# Patient Record
Sex: MALE | Employment: OTHER | URBAN - METROPOLITAN AREA
[De-identification: names, ages, dates, MRNs, and addresses within clinical notes are randomized per-mention and may not be internally consistent; named-entity substitution may affect disease eponyms.]

---

## 2017-01-01 ENCOUNTER — APPOINTMENT (OUTPATIENT)
Dept: GENERAL RADIOLOGY | Age: 77
DRG: 871 | End: 2017-01-01
Attending: UROLOGY
Payer: MEDICARE

## 2017-01-01 ENCOUNTER — ANESTHESIA (OUTPATIENT)
Dept: SURGERY | Age: 77
DRG: 871 | End: 2017-01-01
Payer: MEDICARE

## 2017-01-01 ENCOUNTER — APPOINTMENT (OUTPATIENT)
Dept: CT IMAGING | Age: 77
DRG: 871 | End: 2017-01-01
Attending: EMERGENCY MEDICINE
Payer: MEDICARE

## 2017-01-01 ENCOUNTER — APPOINTMENT (OUTPATIENT)
Dept: ULTRASOUND IMAGING | Age: 77
DRG: 871 | End: 2017-01-01
Attending: HOSPITALIST
Payer: MEDICARE

## 2017-01-01 ENCOUNTER — ANESTHESIA EVENT (OUTPATIENT)
Dept: SURGERY | Age: 77
DRG: 871 | End: 2017-01-01
Payer: MEDICARE

## 2017-01-01 ENCOUNTER — APPOINTMENT (OUTPATIENT)
Dept: GENERAL RADIOLOGY | Age: 77
DRG: 871 | End: 2017-01-01
Attending: EMERGENCY MEDICINE
Payer: MEDICARE

## 2017-01-01 ENCOUNTER — HOSPITAL ENCOUNTER (INPATIENT)
Age: 77
LOS: 3 days | Discharge: HOME | DRG: 871 | End: 2017-06-30
Attending: EMERGENCY MEDICINE | Admitting: HOSPITALIST
Payer: MEDICARE

## 2017-01-01 ENCOUNTER — HOSPITAL ENCOUNTER (INPATIENT)
Age: 77
LOS: 7 days | Discharge: HOME HEALTH | DRG: 871 | End: 2017-06-19
Attending: EMERGENCY MEDICINE | Admitting: HOSPITALIST
Payer: MEDICARE

## 2017-01-01 VITALS
RESPIRATION RATE: 20 BRPM | BODY MASS INDEX: 33.55 KG/M2 | WEIGHT: 261.4 LBS | SYSTOLIC BLOOD PRESSURE: 135 MMHG | HEART RATE: 109 BPM | TEMPERATURE: 97.7 F | OXYGEN SATURATION: 96 % | HEIGHT: 74 IN | DIASTOLIC BLOOD PRESSURE: 86 MMHG

## 2017-01-01 VITALS
HEIGHT: 74 IN | TEMPERATURE: 97.3 F | RESPIRATION RATE: 18 BRPM | HEART RATE: 81 BPM | BODY MASS INDEX: 31.69 KG/M2 | WEIGHT: 246.91 LBS | DIASTOLIC BLOOD PRESSURE: 72 MMHG | OXYGEN SATURATION: 97 % | SYSTOLIC BLOOD PRESSURE: 111 MMHG

## 2017-01-01 DIAGNOSIS — R41.82 ALTERED MENTAL STATUS, UNSPECIFIED ALTERED MENTAL STATUS TYPE: Primary | ICD-10-CM

## 2017-01-01 DIAGNOSIS — N39.0 RECURRENT UTI: ICD-10-CM

## 2017-01-01 DIAGNOSIS — N12 PYELONEPHRITIS: ICD-10-CM

## 2017-01-01 DIAGNOSIS — R00.0 TACHYCARDIA: ICD-10-CM

## 2017-01-01 DIAGNOSIS — R50.9 FEVER, UNSPECIFIED FEVER CAUSE: ICD-10-CM

## 2017-01-01 DIAGNOSIS — A41.9 SEPSIS, DUE TO UNSPECIFIED ORGANISM: Primary | ICD-10-CM

## 2017-01-01 DIAGNOSIS — N17.9 ACUTE KIDNEY INJURY (NONTRAUMATIC) (HCC): ICD-10-CM

## 2017-01-01 DIAGNOSIS — I48.91 ATRIAL FIBRILLATION, UNSPECIFIED TYPE (HCC): ICD-10-CM

## 2017-01-01 LAB
ALBUMIN SERPL BCP-MCNC: 2.9 G/DL (ref 3.4–5)
ALBUMIN SERPL BCP-MCNC: 3 G/DL (ref 3.4–5)
ALBUMIN/GLOB SERPL: 0.8 {RATIO} (ref 0.8–1.7)
ALBUMIN/GLOB SERPL: 0.9 {RATIO} (ref 0.8–1.7)
ALP SERPL-CCNC: 128 U/L (ref 45–117)
ALP SERPL-CCNC: 138 U/L (ref 45–117)
ALT SERPL-CCNC: 25 U/L (ref 16–61)
ALT SERPL-CCNC: 27 U/L (ref 16–61)
AMMONIA PLAS-SCNC: 19 UMOL/L (ref 11–32)
ANION GAP BLD CALC-SCNC: 10 MMOL/L (ref 3–18)
ANION GAP BLD CALC-SCNC: 11 MMOL/L (ref 3–18)
ANION GAP BLD CALC-SCNC: 14 MMOL/L (ref 3–18)
ANION GAP BLD CALC-SCNC: 6 MMOL/L (ref 3–18)
ANION GAP BLD CALC-SCNC: 6 MMOL/L (ref 3–18)
ANION GAP BLD CALC-SCNC: 8 MMOL/L (ref 3–18)
ANION GAP BLD CALC-SCNC: 8 MMOL/L (ref 3–18)
ANION GAP BLD CALC-SCNC: 9 MMOL/L (ref 3–18)
ANION GAP BLD CALC-SCNC: 9 MMOL/L (ref 3–18)
APPEARANCE UR: ABNORMAL
APPEARANCE UR: ABNORMAL
APTT PPP: 35.3 SEC (ref 23–36.4)
AST SERPL W P-5'-P-CCNC: 24 U/L (ref 15–37)
AST SERPL W P-5'-P-CCNC: 24 U/L (ref 15–37)
ATRIAL RATE: 117 BPM
ATRIAL RATE: 128 BPM
ATRIAL RATE: 144 BPM
ATRIAL RATE: 166 BPM
BACTERIA SPEC CULT: ABNORMAL
BACTERIA SPEC CULT: NORMAL
BACTERIA URNS QL MICRO: ABNORMAL /HPF
BACTERIA URNS QL MICRO: ABNORMAL /HPF
BASOPHILS # BLD AUTO: 0 K/UL (ref 0–0.06)
BASOPHILS # BLD: 0 % (ref 0–2)
BILIRUB SERPL-MCNC: 0.9 MG/DL (ref 0.2–1)
BILIRUB SERPL-MCNC: 1.9 MG/DL (ref 0.2–1)
BILIRUB UR QL: NEGATIVE
BILIRUB UR QL: NEGATIVE
BUN SERPL-MCNC: 16 MG/DL (ref 7–18)
BUN SERPL-MCNC: 17 MG/DL (ref 7–18)
BUN SERPL-MCNC: 21 MG/DL (ref 7–18)
BUN SERPL-MCNC: 21 MG/DL (ref 7–18)
BUN SERPL-MCNC: 22 MG/DL (ref 7–18)
BUN SERPL-MCNC: 23 MG/DL (ref 7–18)
BUN SERPL-MCNC: 29 MG/DL (ref 7–18)
BUN SERPL-MCNC: 30 MG/DL (ref 7–18)
BUN SERPL-MCNC: 35 MG/DL (ref 7–18)
BUN SERPL-MCNC: 38 MG/DL (ref 7–18)
BUN SERPL-MCNC: 41 MG/DL (ref 7–18)
BUN/CREAT SERPL: 15 (ref 12–20)
BUN/CREAT SERPL: 16 (ref 12–20)
BUN/CREAT SERPL: 16 (ref 12–20)
BUN/CREAT SERPL: 17 (ref 12–20)
BUN/CREAT SERPL: 18 (ref 12–20)
BUN/CREAT SERPL: 20 (ref 12–20)
BUN/CREAT SERPL: 22 (ref 12–20)
BUN/CREAT SERPL: 23 (ref 12–20)
BUN/CREAT SERPL: 24 (ref 12–20)
BUN/CREAT SERPL: 25 (ref 12–20)
BUN/CREAT SERPL: 25 (ref 12–20)
CA-I SERPL-SCNC: 1.05 MMOL/L (ref 1.12–1.32)
CALCIUM SERPL-MCNC: 7.7 MG/DL (ref 8.5–10.1)
CALCIUM SERPL-MCNC: 7.8 MG/DL (ref 8.5–10.1)
CALCIUM SERPL-MCNC: 7.8 MG/DL (ref 8.5–10.1)
CALCIUM SERPL-MCNC: 7.9 MG/DL (ref 8.5–10.1)
CALCIUM SERPL-MCNC: 8 MG/DL (ref 8.5–10.1)
CALCIUM SERPL-MCNC: 8.1 MG/DL (ref 8.5–10.1)
CALCIUM SERPL-MCNC: 8.1 MG/DL (ref 8.5–10.1)
CALCIUM SERPL-MCNC: 8.2 MG/DL (ref 8.5–10.1)
CALCIUM SERPL-MCNC: 8.3 MG/DL (ref 8.5–10.1)
CALCIUM SERPL-MCNC: 8.7 MG/DL (ref 8.5–10.1)
CALCIUM SERPL-MCNC: 8.8 MG/DL (ref 8.5–10.1)
CALCULATED R AXIS, ECG10: -51 DEGREES
CALCULATED R AXIS, ECG10: -58 DEGREES
CALCULATED R AXIS, ECG10: -80 DEGREES
CALCULATED R AXIS, ECG10: -86 DEGREES
CALCULATED T AXIS, ECG11: -11 DEGREES
CALCULATED T AXIS, ECG11: -11 DEGREES
CALCULATED T AXIS, ECG11: -16 DEGREES
CALCULATED T AXIS, ECG11: -4 DEGREES
CHLORIDE SERPL-SCNC: 106 MMOL/L (ref 100–108)
CHLORIDE SERPL-SCNC: 108 MMOL/L (ref 100–108)
CHLORIDE SERPL-SCNC: 109 MMOL/L (ref 100–108)
CHLORIDE SERPL-SCNC: 109 MMOL/L (ref 100–108)
CHLORIDE SERPL-SCNC: 111 MMOL/L (ref 100–108)
CHLORIDE SERPL-SCNC: 113 MMOL/L (ref 100–108)
CHLORIDE SERPL-SCNC: 115 MMOL/L (ref 100–108)
CHLORIDE SERPL-SCNC: 116 MMOL/L (ref 100–108)
CHLORIDE SERPL-SCNC: 117 MMOL/L (ref 100–108)
CHLORIDE SERPL-SCNC: 117 MMOL/L (ref 100–108)
CHLORIDE SERPL-SCNC: 119 MMOL/L (ref 100–108)
CK MB CFR SERPL CALC: ABNORMAL % (ref 0–4)
CK MB CFR SERPL CALC: ABNORMAL % (ref 0–4)
CK MB SERPL-MCNC: <1 NG/ML (ref 5–25)
CK MB SERPL-MCNC: <1 NG/ML (ref 5–25)
CK SERPL-CCNC: 22 U/L (ref 39–308)
CK SERPL-CCNC: 32 U/L (ref 39–308)
CO2 SERPL-SCNC: 22 MMOL/L (ref 21–32)
CO2 SERPL-SCNC: 22 MMOL/L (ref 21–32)
CO2 SERPL-SCNC: 23 MMOL/L (ref 21–32)
CO2 SERPL-SCNC: 24 MMOL/L (ref 21–32)
CO2 SERPL-SCNC: 25 MMOL/L (ref 21–32)
CO2 SERPL-SCNC: 29 MMOL/L (ref 21–32)
COLOR UR: ABNORMAL
COLOR UR: YELLOW
CREAT SERPL-MCNC: 0.93 MG/DL (ref 0.6–1.3)
CREAT SERPL-MCNC: 1 MG/DL (ref 0.6–1.3)
CREAT SERPL-MCNC: 1.04 MG/DL (ref 0.6–1.3)
CREAT SERPL-MCNC: 1.06 MG/DL (ref 0.6–1.3)
CREAT SERPL-MCNC: 1.19 MG/DL (ref 0.6–1.3)
CREAT SERPL-MCNC: 1.22 MG/DL (ref 0.6–1.3)
CREAT SERPL-MCNC: 1.37 MG/DL (ref 0.6–1.3)
CREAT SERPL-MCNC: 1.38 MG/DL (ref 0.6–1.3)
CREAT SERPL-MCNC: 1.4 MG/DL (ref 0.6–1.3)
CREAT SERPL-MCNC: 1.81 MG/DL (ref 0.6–1.3)
CREAT SERPL-MCNC: 2.32 MG/DL (ref 0.6–1.3)
DIAGNOSIS, 93000: NORMAL
DIFFERENTIAL METHOD BLD: ABNORMAL
EOSINOPHIL # BLD: 0 K/UL (ref 0–0.4)
EOSINOPHIL # BLD: 0.1 K/UL (ref 0–0.4)
EOSINOPHIL NFR BLD: 0 % (ref 0–5)
EOSINOPHIL NFR BLD: 1 % (ref 0–5)
EOSINOPHIL NFR BLD: 2 % (ref 0–5)
EOSINOPHIL NFR BLD: 2 % (ref 0–5)
EPITH CASTS URNS QL MICRO: ABNORMAL /LPF (ref 0–5)
EPITH CASTS URNS QL MICRO: ABNORMAL /LPF (ref 0–5)
ERYTHROCYTE [DISTWIDTH] IN BLOOD BY AUTOMATED COUNT: 15 % (ref 11.6–14.5)
ERYTHROCYTE [DISTWIDTH] IN BLOOD BY AUTOMATED COUNT: 15.2 % (ref 11.6–14.5)
ERYTHROCYTE [DISTWIDTH] IN BLOOD BY AUTOMATED COUNT: 15.2 % (ref 11.6–14.5)
ERYTHROCYTE [DISTWIDTH] IN BLOOD BY AUTOMATED COUNT: 15.3 % (ref 11.6–14.5)
ERYTHROCYTE [DISTWIDTH] IN BLOOD BY AUTOMATED COUNT: 15.4 % (ref 11.6–14.5)
ERYTHROCYTE [DISTWIDTH] IN BLOOD BY AUTOMATED COUNT: 15.6 % (ref 11.6–14.5)
GLOBULIN SER CALC-MCNC: 3.4 G/DL (ref 2–4)
GLOBULIN SER CALC-MCNC: 3.8 G/DL (ref 2–4)
GLUCOSE BLD STRIP.AUTO-MCNC: 138 MG/DL (ref 70–110)
GLUCOSE BLD STRIP.AUTO-MCNC: 141 MG/DL (ref 70–110)
GLUCOSE BLD STRIP.AUTO-MCNC: 158 MG/DL (ref 70–110)
GLUCOSE BLD STRIP.AUTO-MCNC: 161 MG/DL (ref 70–110)
GLUCOSE BLD STRIP.AUTO-MCNC: 169 MG/DL (ref 70–110)
GLUCOSE BLD STRIP.AUTO-MCNC: 169 MG/DL (ref 70–110)
GLUCOSE BLD STRIP.AUTO-MCNC: 177 MG/DL (ref 70–110)
GLUCOSE BLD STRIP.AUTO-MCNC: 180 MG/DL (ref 70–110)
GLUCOSE BLD STRIP.AUTO-MCNC: 186 MG/DL (ref 70–110)
GLUCOSE BLD STRIP.AUTO-MCNC: 197 MG/DL (ref 70–110)
GLUCOSE BLD STRIP.AUTO-MCNC: 198 MG/DL (ref 70–110)
GLUCOSE BLD STRIP.AUTO-MCNC: 202 MG/DL (ref 70–110)
GLUCOSE BLD STRIP.AUTO-MCNC: 202 MG/DL (ref 70–110)
GLUCOSE BLD STRIP.AUTO-MCNC: 212 MG/DL (ref 70–110)
GLUCOSE BLD STRIP.AUTO-MCNC: 218 MG/DL (ref 70–110)
GLUCOSE BLD STRIP.AUTO-MCNC: 223 MG/DL (ref 70–110)
GLUCOSE BLD STRIP.AUTO-MCNC: 228 MG/DL (ref 70–110)
GLUCOSE BLD STRIP.AUTO-MCNC: 242 MG/DL (ref 70–110)
GLUCOSE BLD STRIP.AUTO-MCNC: 246 MG/DL (ref 70–110)
GLUCOSE BLD STRIP.AUTO-MCNC: 261 MG/DL (ref 70–110)
GLUCOSE BLD STRIP.AUTO-MCNC: 287 MG/DL (ref 70–110)
GLUCOSE BLD STRIP.AUTO-MCNC: 307 MG/DL (ref 70–110)
GLUCOSE SERPL-MCNC: 115 MG/DL (ref 74–99)
GLUCOSE SERPL-MCNC: 129 MG/DL (ref 74–99)
GLUCOSE SERPL-MCNC: 146 MG/DL (ref 74–99)
GLUCOSE SERPL-MCNC: 153 MG/DL (ref 74–99)
GLUCOSE SERPL-MCNC: 172 MG/DL (ref 74–99)
GLUCOSE SERPL-MCNC: 191 MG/DL (ref 74–99)
GLUCOSE SERPL-MCNC: 194 MG/DL (ref 74–99)
GLUCOSE SERPL-MCNC: 196 MG/DL (ref 74–99)
GLUCOSE SERPL-MCNC: 200 MG/DL (ref 74–99)
GLUCOSE SERPL-MCNC: 205 MG/DL (ref 74–99)
GLUCOSE SERPL-MCNC: 256 MG/DL (ref 74–99)
GLUCOSE UR STRIP.AUTO-MCNC: NEGATIVE MG/DL
GLUCOSE UR STRIP.AUTO-MCNC: NEGATIVE MG/DL
GRAM STN SPEC: ABNORMAL
HCT VFR BLD AUTO: 34.1 % (ref 36–48)
HCT VFR BLD AUTO: 34.5 % (ref 36–48)
HCT VFR BLD AUTO: 35.1 % (ref 36–48)
HCT VFR BLD AUTO: 35.5 % (ref 36–48)
HCT VFR BLD AUTO: 36.4 % (ref 36–48)
HCT VFR BLD AUTO: 37.8 % (ref 36–48)
HCT VFR BLD AUTO: 40.3 % (ref 36–48)
HCT VFR BLD AUTO: 41.7 % (ref 36–48)
HGB BLD-MCNC: 10.9 G/DL (ref 13–16)
HGB BLD-MCNC: 10.9 G/DL (ref 13–16)
HGB BLD-MCNC: 11.1 G/DL (ref 13–16)
HGB BLD-MCNC: 11.2 G/DL (ref 13–16)
HGB BLD-MCNC: 11.8 G/DL (ref 13–16)
HGB BLD-MCNC: 12 G/DL (ref 13–16)
HGB BLD-MCNC: 13.4 G/DL (ref 13–16)
HGB BLD-MCNC: 13.7 G/DL (ref 13–16)
HGB UR QL STRIP: ABNORMAL
HGB UR QL STRIP: ABNORMAL
INR PPP: 1.1 (ref 0.8–1.2)
KETONES UR QL STRIP.AUTO: NEGATIVE MG/DL
KETONES UR QL STRIP.AUTO: NEGATIVE MG/DL
LACTATE BLD-SCNC: 1.2 MMOL/L (ref 0.4–2)
LACTATE BLD-SCNC: 2.3 MMOL/L (ref 0.4–2)
LACTATE BLD-SCNC: 4.9 MMOL/L (ref 0.4–2)
LEUKOCYTE ESTERASE UR QL STRIP.AUTO: ABNORMAL
LEUKOCYTE ESTERASE UR QL STRIP.AUTO: ABNORMAL
LYMPHOCYTES # BLD AUTO: 10 % (ref 21–52)
LYMPHOCYTES # BLD AUTO: 13 % (ref 21–52)
LYMPHOCYTES # BLD AUTO: 16 % (ref 21–52)
LYMPHOCYTES # BLD AUTO: 18 % (ref 21–52)
LYMPHOCYTES # BLD AUTO: 28 % (ref 21–52)
LYMPHOCYTES # BLD AUTO: 28 % (ref 21–52)
LYMPHOCYTES # BLD AUTO: 8 % (ref 21–52)
LYMPHOCYTES # BLD AUTO: 9 % (ref 21–52)
LYMPHOCYTES # BLD: 0.4 K/UL (ref 0.9–3.6)
LYMPHOCYTES # BLD: 0.4 K/UL (ref 0.9–3.6)
LYMPHOCYTES # BLD: 0.6 K/UL (ref 0.9–3.6)
LYMPHOCYTES # BLD: 0.9 K/UL (ref 0.9–3.6)
LYMPHOCYTES # BLD: 1.3 K/UL (ref 0.9–3.6)
LYMPHOCYTES # BLD: 1.7 K/UL (ref 0.9–3.6)
LYMPHOCYTES # BLD: 2 K/UL (ref 0.9–3.6)
LYMPHOCYTES # BLD: 2 K/UL (ref 0.9–3.6)
MAGNESIUM SERPL-MCNC: 2.1 MG/DL (ref 1.6–2.6)
MAGNESIUM SERPL-MCNC: 2.2 MG/DL (ref 1.6–2.6)
MAGNESIUM SERPL-MCNC: 2.3 MG/DL (ref 1.6–2.6)
MAGNESIUM SERPL-MCNC: 2.4 MG/DL (ref 1.6–2.6)
MCH RBC QN AUTO: 28.8 PG (ref 24–34)
MCH RBC QN AUTO: 28.9 PG (ref 24–34)
MCH RBC QN AUTO: 29.1 PG (ref 24–34)
MCH RBC QN AUTO: 29.1 PG (ref 24–34)
MCH RBC QN AUTO: 29.3 PG (ref 24–34)
MCH RBC QN AUTO: 29.8 PG (ref 24–34)
MCHC RBC AUTO-ENTMCNC: 31.5 G/DL (ref 31–37)
MCHC RBC AUTO-ENTMCNC: 31.6 G/DL (ref 31–37)
MCHC RBC AUTO-ENTMCNC: 31.6 G/DL (ref 31–37)
MCHC RBC AUTO-ENTMCNC: 31.7 G/DL (ref 31–37)
MCHC RBC AUTO-ENTMCNC: 32 G/DL (ref 31–37)
MCHC RBC AUTO-ENTMCNC: 32.4 G/DL (ref 31–37)
MCHC RBC AUTO-ENTMCNC: 32.9 G/DL (ref 31–37)
MCHC RBC AUTO-ENTMCNC: 33.3 G/DL (ref 31–37)
MCV RBC AUTO: 88.5 FL (ref 74–97)
MCV RBC AUTO: 89.8 FL (ref 74–97)
MCV RBC AUTO: 90.2 FL (ref 74–97)
MCV RBC AUTO: 90.3 FL (ref 74–97)
MCV RBC AUTO: 91.5 FL (ref 74–97)
MCV RBC AUTO: 91.9 FL (ref 74–97)
MCV RBC AUTO: 92.4 FL (ref 74–97)
MCV RBC AUTO: 92.7 FL (ref 74–97)
MONOCYTES # BLD: 0.1 K/UL (ref 0.05–1.2)
MONOCYTES # BLD: 0.5 K/UL (ref 0.05–1.2)
MONOCYTES # BLD: 0.5 K/UL (ref 0.05–1.2)
MONOCYTES # BLD: 0.7 K/UL (ref 0.05–1.2)
MONOCYTES # BLD: 0.8 K/UL (ref 0.05–1.2)
MONOCYTES # BLD: 0.8 K/UL (ref 0.05–1.2)
MONOCYTES # BLD: 0.9 K/UL (ref 0.05–1.2)
MONOCYTES # BLD: 1.5 K/UL (ref 0.05–1.2)
MONOCYTES NFR BLD AUTO: 10 % (ref 3–10)
MONOCYTES NFR BLD AUTO: 12 % (ref 3–10)
MONOCYTES NFR BLD AUTO: 12 % (ref 3–10)
MONOCYTES NFR BLD AUTO: 13 % (ref 3–10)
MONOCYTES NFR BLD AUTO: 14 % (ref 3–10)
MONOCYTES NFR BLD AUTO: 3 % (ref 3–10)
MONOCYTES NFR BLD AUTO: 8 % (ref 3–10)
MONOCYTES NFR BLD AUTO: 9 % (ref 3–10)
NEUTS SEG # BLD: 12.1 K/UL (ref 1.8–8)
NEUTS SEG # BLD: 3.7 K/UL (ref 1.8–8)
NEUTS SEG # BLD: 3.8 K/UL (ref 1.8–8)
NEUTS SEG # BLD: 4 K/UL (ref 1.8–8)
NEUTS SEG # BLD: 4.2 K/UL (ref 1.8–8)
NEUTS SEG # BLD: 4.3 K/UL (ref 1.8–8)
NEUTS SEG # BLD: 4.9 K/UL (ref 1.8–8)
NEUTS SEG # BLD: 4.9 K/UL (ref 1.8–8)
NEUTS SEG NFR BLD AUTO: 58 % (ref 40–73)
NEUTS SEG NFR BLD AUTO: 61 % (ref 40–73)
NEUTS SEG NFR BLD AUTO: 69 % (ref 40–73)
NEUTS SEG NFR BLD AUTO: 70 % (ref 40–73)
NEUTS SEG NFR BLD AUTO: 77 % (ref 40–73)
NEUTS SEG NFR BLD AUTO: 77 % (ref 40–73)
NEUTS SEG NFR BLD AUTO: 84 % (ref 40–73)
NEUTS SEG NFR BLD AUTO: 88 % (ref 40–73)
NITRITE UR QL STRIP.AUTO: NEGATIVE
NITRITE UR QL STRIP.AUTO: POSITIVE
P-R INTERVAL, ECG05: 152 MS
PH UR STRIP: 5 [PH] (ref 5–8)
PH UR STRIP: 5.5 [PH] (ref 5–8)
PHOSPHATE SERPL-MCNC: 2.8 MG/DL (ref 2.5–4.9)
PLATELET # BLD AUTO: 106 K/UL (ref 135–420)
PLATELET # BLD AUTO: 128 K/UL (ref 135–420)
PLATELET # BLD AUTO: 209 K/UL (ref 135–420)
PLATELET # BLD AUTO: 52 K/UL (ref 135–420)
PLATELET # BLD AUTO: 53 K/UL (ref 135–420)
PLATELET # BLD AUTO: 62 K/UL (ref 135–420)
PLATELET # BLD AUTO: 65 K/UL (ref 135–420)
PLATELET # BLD AUTO: 88 K/UL (ref 135–420)
PMV BLD AUTO: 10.4 FL (ref 9.2–11.8)
PMV BLD AUTO: 10.6 FL (ref 9.2–11.8)
PMV BLD AUTO: 8.9 FL (ref 9.2–11.8)
PMV BLD AUTO: 9 FL (ref 9.2–11.8)
PMV BLD AUTO: 9.3 FL (ref 9.2–11.8)
PMV BLD AUTO: 9.5 FL (ref 9.2–11.8)
PMV BLD AUTO: 9.7 FL (ref 9.2–11.8)
PMV BLD AUTO: 9.7 FL (ref 9.2–11.8)
POTASSIUM SERPL-SCNC: 3.1 MMOL/L (ref 3.5–5.5)
POTASSIUM SERPL-SCNC: 3.3 MMOL/L (ref 3.5–5.5)
POTASSIUM SERPL-SCNC: 3.4 MMOL/L (ref 3.5–5.5)
POTASSIUM SERPL-SCNC: 3.5 MMOL/L (ref 3.5–5.5)
POTASSIUM SERPL-SCNC: 3.5 MMOL/L (ref 3.5–5.5)
POTASSIUM SERPL-SCNC: 3.8 MMOL/L (ref 3.5–5.5)
POTASSIUM SERPL-SCNC: 3.8 MMOL/L (ref 3.5–5.5)
PROT SERPL-MCNC: 6.3 G/DL (ref 6.4–8.2)
PROT SERPL-MCNC: 6.8 G/DL (ref 6.4–8.2)
PROT UR STRIP-MCNC: 100 MG/DL
PROT UR STRIP-MCNC: 100 MG/DL
PROTHROMBIN TIME: 13.8 SEC (ref 11.5–15.2)
Q-T INTERVAL, ECG07: 278 MS
Q-T INTERVAL, ECG07: 302 MS
Q-T INTERVAL, ECG07: 308 MS
Q-T INTERVAL, ECG07: 408 MS
QRS DURATION, ECG06: 142 MS
QRS DURATION, ECG06: 150 MS
QRS DURATION, ECG06: 156 MS
QRS DURATION, ECG06: 156 MS
QTC CALCULATION (BEZET), ECG08: 449 MS
QTC CALCULATION (BEZET), ECG08: 464 MS
QTC CALCULATION (BEZET), ECG08: 471 MS
QTC CALCULATION (BEZET), ECG08: 579 MS
RBC # BLD AUTO: 3.72 M/UL (ref 4.7–5.5)
RBC # BLD AUTO: 3.78 M/UL (ref 4.7–5.5)
RBC # BLD AUTO: 3.82 M/UL (ref 4.7–5.5)
RBC # BLD AUTO: 3.88 M/UL (ref 4.7–5.5)
RBC # BLD AUTO: 4.03 M/UL (ref 4.7–5.5)
RBC # BLD AUTO: 4.09 M/UL (ref 4.7–5.5)
RBC # BLD AUTO: 4.49 M/UL (ref 4.7–5.5)
RBC # BLD AUTO: 4.71 M/UL (ref 4.7–5.5)
RBC #/AREA URNS HPF: ABNORMAL /HPF (ref 0–5)
RBC #/AREA URNS HPF: ABNORMAL /HPF (ref 0–5)
SERVICE CMNT-IMP: ABNORMAL
SERVICE CMNT-IMP: NORMAL
SODIUM SERPL-SCNC: 141 MMOL/L (ref 136–145)
SODIUM SERPL-SCNC: 142 MMOL/L (ref 136–145)
SODIUM SERPL-SCNC: 144 MMOL/L (ref 136–145)
SODIUM SERPL-SCNC: 144 MMOL/L (ref 136–145)
SODIUM SERPL-SCNC: 146 MMOL/L (ref 136–145)
SODIUM SERPL-SCNC: 147 MMOL/L (ref 136–145)
SODIUM SERPL-SCNC: 147 MMOL/L (ref 136–145)
SODIUM SERPL-SCNC: 148 MMOL/L (ref 136–145)
SODIUM SERPL-SCNC: 148 MMOL/L (ref 136–145)
SODIUM SERPL-SCNC: 150 MMOL/L (ref 136–145)
SODIUM SERPL-SCNC: 152 MMOL/L (ref 136–145)
SP GR UR REFRACTOMETRY: 1.02 (ref 1–1.03)
SP GR UR REFRACTOMETRY: 1.02 (ref 1–1.03)
TROPONIN I SERPL-MCNC: <0.02 NG/ML (ref 0–0.04)
TROPONIN I SERPL-MCNC: <0.02 NG/ML (ref 0–0.04)
TSH SERPL DL<=0.05 MIU/L-ACNC: 3.08 UIU/ML (ref 0.36–3.74)
UROBILINOGEN UR QL STRIP.AUTO: 0.2 EU/DL (ref 0.2–1)
UROBILINOGEN UR QL STRIP.AUTO: 1 EU/DL (ref 0.2–1)
VENTRICULAR RATE, ECG03: 121 BPM
VENTRICULAR RATE, ECG03: 128 BPM
VENTRICULAR RATE, ECG03: 142 BPM
VENTRICULAR RATE, ECG03: 173 BPM
WBC # BLD AUTO: 15.7 K/UL (ref 4.6–13.2)
WBC # BLD AUTO: 4.3 K/UL (ref 4.6–13.2)
WBC # BLD AUTO: 5.6 K/UL (ref 4.6–13.2)
WBC # BLD AUTO: 5.7 K/UL (ref 4.6–13.2)
WBC # BLD AUTO: 5.8 K/UL (ref 4.6–13.2)
WBC # BLD AUTO: 6.1 K/UL (ref 4.6–13.2)
WBC # BLD AUTO: 7.2 K/UL (ref 4.6–13.2)
WBC # BLD AUTO: 7.2 K/UL (ref 4.6–13.2)
WBC URNS QL MICRO: ABNORMAL /HPF (ref 0–4)
WBC URNS QL MICRO: ABNORMAL /HPF (ref 0–4)

## 2017-01-01 PROCEDURE — 92610 EVALUATE SWALLOWING FUNCTION: CPT

## 2017-01-01 PROCEDURE — 93306 TTE W/DOPPLER COMPLETE: CPT

## 2017-01-01 PROCEDURE — 74011250636 HC RX REV CODE- 250/636: Performed by: EMERGENCY MEDICINE

## 2017-01-01 PROCEDURE — 74011000258 HC RX REV CODE- 258: Performed by: HOSPITALIST

## 2017-01-01 PROCEDURE — 80048 BASIC METABOLIC PNL TOTAL CA: CPT | Performed by: HOSPITALIST

## 2017-01-01 PROCEDURE — 74011250637 HC RX REV CODE- 250/637: Performed by: EMERGENCY MEDICINE

## 2017-01-01 PROCEDURE — 36415 COLL VENOUS BLD VENIPUNCTURE: CPT | Performed by: HOSPITALIST

## 2017-01-01 PROCEDURE — 74011000258 HC RX REV CODE- 258

## 2017-01-01 PROCEDURE — 74011250637 HC RX REV CODE- 250/637: Performed by: PHYSICIAN ASSISTANT

## 2017-01-01 PROCEDURE — 65660000000 HC RM CCU STEPDOWN

## 2017-01-01 PROCEDURE — 74011636637 HC RX REV CODE- 636/637: Performed by: HOSPITALIST

## 2017-01-01 PROCEDURE — 74011250637 HC RX REV CODE- 250/637: Performed by: HOSPITALIST

## 2017-01-01 PROCEDURE — 74011250636 HC RX REV CODE- 250/636

## 2017-01-01 PROCEDURE — 74011250637 HC RX REV CODE- 250/637: Performed by: INTERNAL MEDICINE

## 2017-01-01 PROCEDURE — 77030018846 HC SOL IRR STRL H20 ICUM -A: Performed by: UROLOGY

## 2017-01-01 PROCEDURE — 82962 GLUCOSE BLOOD TEST: CPT

## 2017-01-01 PROCEDURE — 77030032490 HC SLV COMPR SCD KNE COVD -B: Performed by: UROLOGY

## 2017-01-01 PROCEDURE — 83735 ASSAY OF MAGNESIUM: CPT | Performed by: EMERGENCY MEDICINE

## 2017-01-01 PROCEDURE — 96368 THER/DIAG CONCURRENT INF: CPT

## 2017-01-01 PROCEDURE — 83605 ASSAY OF LACTIC ACID: CPT

## 2017-01-01 PROCEDURE — 77030005520 HC CATH URETH FOL38 BARD -A: Performed by: UROLOGY

## 2017-01-01 PROCEDURE — 99285 EMERGENCY DEPT VISIT HI MDM: CPT

## 2017-01-01 PROCEDURE — 87086 URINE CULTURE/COLONY COUNT: CPT | Performed by: EMERGENCY MEDICINE

## 2017-01-01 PROCEDURE — 74011000258 HC RX REV CODE- 258: Performed by: EMERGENCY MEDICINE

## 2017-01-01 PROCEDURE — 77010033678 HC OXYGEN DAILY

## 2017-01-01 PROCEDURE — 71010 XR CHEST PORT: CPT

## 2017-01-01 PROCEDURE — 74011000250 HC RX REV CODE- 250: Performed by: INTERNAL MEDICINE

## 2017-01-01 PROCEDURE — 82140 ASSAY OF AMMONIA: CPT | Performed by: EMERGENCY MEDICINE

## 2017-01-01 PROCEDURE — 77030006974 HC BSKT URET RTVR BSC -C: Performed by: UROLOGY

## 2017-01-01 PROCEDURE — 96361 HYDRATE IV INFUSION ADD-ON: CPT

## 2017-01-01 PROCEDURE — 96365 THER/PROPH/DIAG IV INF INIT: CPT

## 2017-01-01 PROCEDURE — 65270000029 HC RM PRIVATE

## 2017-01-01 PROCEDURE — C1758 CATHETER, URETERAL: HCPCS | Performed by: UROLOGY

## 2017-01-01 PROCEDURE — C1751 CATH, INF, PER/CENT/MIDLINE: HCPCS

## 2017-01-01 PROCEDURE — 80053 COMPREHEN METABOLIC PANEL: CPT | Performed by: EMERGENCY MEDICINE

## 2017-01-01 PROCEDURE — 81001 URINALYSIS AUTO W/SCOPE: CPT | Performed by: EMERGENCY MEDICINE

## 2017-01-01 PROCEDURE — 82550 ASSAY OF CK (CPK): CPT | Performed by: EMERGENCY MEDICINE

## 2017-01-01 PROCEDURE — 77030033263 HC DRSG MEPILEX 16-48IN BORD MOLN -B

## 2017-01-01 PROCEDURE — 77030034848

## 2017-01-01 PROCEDURE — 87040 BLOOD CULTURE FOR BACTERIA: CPT | Performed by: EMERGENCY MEDICINE

## 2017-01-01 PROCEDURE — 77030018836 HC SOL IRR NACL ICUM -A: Performed by: UROLOGY

## 2017-01-01 PROCEDURE — 74011250636 HC RX REV CODE- 250/636: Performed by: HOSPITALIST

## 2017-01-01 PROCEDURE — 76210000016 HC OR PH I REC 1 TO 1.5 HR: Performed by: UROLOGY

## 2017-01-01 PROCEDURE — 74011000250 HC RX REV CODE- 250

## 2017-01-01 PROCEDURE — 85025 COMPLETE CBC W/AUTO DIFF WBC: CPT | Performed by: HOSPITALIST

## 2017-01-01 PROCEDURE — 74011000250 HC RX REV CODE- 250: Performed by: PHYSICIAN ASSISTANT

## 2017-01-01 PROCEDURE — 93005 ELECTROCARDIOGRAM TRACING: CPT

## 2017-01-01 PROCEDURE — 76010000138 HC OR TIME 0.5 TO 1 HR: Performed by: UROLOGY

## 2017-01-01 PROCEDURE — 97162 PT EVAL MOD COMPLEX 30 MIN: CPT

## 2017-01-01 PROCEDURE — 51702 INSERT TEMP BLADDER CATH: CPT

## 2017-01-01 PROCEDURE — 77030020256 HC SOL INJ NACL 0.9%  500ML

## 2017-01-01 PROCEDURE — 74011000250 HC RX REV CODE- 250: Performed by: EMERGENCY MEDICINE

## 2017-01-01 PROCEDURE — 87077 CULTURE AEROBIC IDENTIFY: CPT | Performed by: EMERGENCY MEDICINE

## 2017-01-01 PROCEDURE — C1769 GUIDE WIRE: HCPCS | Performed by: UROLOGY

## 2017-01-01 PROCEDURE — 74011000250 HC RX REV CODE- 250: Performed by: HOSPITALIST

## 2017-01-01 PROCEDURE — 0T778DZ DILATION OF LEFT URETER WITH INTRALUMINAL DEVICE, VIA NATURAL OR ARTIFICIAL OPENING ENDOSCOPIC: ICD-10-PCS | Performed by: UROLOGY

## 2017-01-01 PROCEDURE — 87040 BLOOD CULTURE FOR BACTERIA: CPT | Performed by: HOSPITALIST

## 2017-01-01 PROCEDURE — 96367 TX/PROPH/DG ADDL SEQ IV INF: CPT

## 2017-01-01 PROCEDURE — 77030032490 HC SLV COMPR SCD KNE COVD -B

## 2017-01-01 PROCEDURE — 83735 ASSAY OF MAGNESIUM: CPT | Performed by: HOSPITALIST

## 2017-01-01 PROCEDURE — 70450 CT HEAD/BRAIN W/O DYE: CPT

## 2017-01-01 PROCEDURE — 74176 CT ABD & PELVIS W/O CONTRAST: CPT

## 2017-01-01 PROCEDURE — 82330 ASSAY OF CALCIUM: CPT | Performed by: HOSPITALIST

## 2017-01-01 PROCEDURE — 87186 SC STD MICRODIL/AGAR DIL: CPT | Performed by: EMERGENCY MEDICINE

## 2017-01-01 PROCEDURE — 85025 COMPLETE CBC W/AUTO DIFF WBC: CPT | Performed by: EMERGENCY MEDICINE

## 2017-01-01 PROCEDURE — 74011636320 HC RX REV CODE- 636/320: Performed by: UROLOGY

## 2017-01-01 PROCEDURE — 96366 THER/PROPH/DIAG IV INF ADDON: CPT

## 2017-01-01 PROCEDURE — 76060000032 HC ANESTHESIA 0.5 TO 1 HR: Performed by: UROLOGY

## 2017-01-01 PROCEDURE — 77030010545

## 2017-01-01 PROCEDURE — 84443 ASSAY THYROID STIM HORMONE: CPT | Performed by: EMERGENCY MEDICINE

## 2017-01-01 PROCEDURE — 84100 ASSAY OF PHOSPHORUS: CPT | Performed by: HOSPITALIST

## 2017-01-01 PROCEDURE — 97530 THERAPEUTIC ACTIVITIES: CPT

## 2017-01-01 PROCEDURE — C2617 STENT, NON-COR, TEM W/O DEL: HCPCS | Performed by: UROLOGY

## 2017-01-01 PROCEDURE — 74011000258 HC RX REV CODE- 258: Performed by: PHYSICIAN ASSISTANT

## 2017-01-01 PROCEDURE — 85610 PROTHROMBIN TIME: CPT | Performed by: EMERGENCY MEDICINE

## 2017-01-01 PROCEDURE — 77030011640 HC PAD GRND REM COVD -A: Performed by: UROLOGY

## 2017-01-01 PROCEDURE — BT1F1ZZ FLUOROSCOPY OF LEFT KIDNEY, URETER AND BLADDER USING LOW OSMOLAR CONTRAST: ICD-10-PCS | Performed by: UROLOGY

## 2017-01-01 PROCEDURE — 97110 THERAPEUTIC EXERCISES: CPT

## 2017-01-01 PROCEDURE — 87086 URINE CULTURE/COLONY COUNT: CPT | Performed by: UROLOGY

## 2017-01-01 PROCEDURE — 85730 THROMBOPLASTIN TIME PARTIAL: CPT | Performed by: EMERGENCY MEDICINE

## 2017-01-01 PROCEDURE — 77030018842 HC SOL IRR SOD CL 9% BAXT -A: Performed by: UROLOGY

## 2017-01-01 PROCEDURE — 74011000258 HC RX REV CODE- 258: Performed by: INTERNAL MEDICINE

## 2017-01-01 DEVICE — URETERAL STENT
Type: IMPLANTABLE DEVICE | Site: URETER | Status: FUNCTIONAL
Brand: POLARIS™ ULTRA

## 2017-01-01 RX ORDER — METOPROLOL TARTRATE 5 MG/5ML
5 INJECTION INTRAVENOUS ONCE
Status: COMPLETED | OUTPATIENT
Start: 2017-01-01 | End: 2017-01-01

## 2017-01-01 RX ORDER — METOPROLOL TARTRATE 50 MG/1
50 TABLET ORAL 2 TIMES DAILY
Status: DISCONTINUED | OUTPATIENT
Start: 2017-01-01 | End: 2017-01-01

## 2017-01-01 RX ORDER — MENTHOL AND ZINC OXIDE .44; 20.625 G/100G; G/100G
0.44 OINTMENT TOPICAL 2 TIMES DAILY
Qty: 1 TUBE | Refills: 0 | Status: SHIPPED | OUTPATIENT
Start: 2017-01-01

## 2017-01-01 RX ORDER — FUROSEMIDE 40 MG/1
40 TABLET ORAL DAILY
Status: DISCONTINUED | OUTPATIENT
Start: 2017-01-01 | End: 2017-01-01 | Stop reason: HOSPADM

## 2017-01-01 RX ORDER — INSULIN LISPRO 100 [IU]/ML
INJECTION, SOLUTION INTRAVENOUS; SUBCUTANEOUS ONCE
Status: DISCONTINUED | OUTPATIENT
Start: 2017-01-01 | End: 2017-01-01 | Stop reason: HOSPADM

## 2017-01-01 RX ORDER — ASPIRIN 81 MG/1
81 TABLET ORAL DAILY
Status: DISCONTINUED | OUTPATIENT
Start: 2017-01-01 | End: 2017-01-01

## 2017-01-01 RX ORDER — MEMANTINE HYDROCHLORIDE 10 MG/1
10 TABLET ORAL DAILY
Status: DISCONTINUED | OUTPATIENT
Start: 2017-01-01 | End: 2017-01-01 | Stop reason: HOSPADM

## 2017-01-01 RX ORDER — FINASTERIDE 5 MG/1
5 TABLET, FILM COATED ORAL DAILY
Status: DISCONTINUED | OUTPATIENT
Start: 2017-01-01 | End: 2017-01-01

## 2017-01-01 RX ORDER — LEVOFLOXACIN 750 MG/1
750 TABLET ORAL EVERY 24 HOURS
Status: DISCONTINUED | OUTPATIENT
Start: 2017-01-01 | End: 2017-01-01 | Stop reason: HOSPADM

## 2017-01-01 RX ORDER — NYSTATIN 100000 [USP'U]/G
POWDER TOPICAL 2 TIMES DAILY
Status: DISCONTINUED | OUTPATIENT
Start: 2017-01-01 | End: 2017-01-01 | Stop reason: HOSPADM

## 2017-01-01 RX ORDER — TAMSULOSIN HYDROCHLORIDE 0.4 MG/1
0.4 CAPSULE ORAL DAILY
Status: DISCONTINUED | OUTPATIENT
Start: 2017-01-01 | End: 2017-01-01 | Stop reason: HOSPADM

## 2017-01-01 RX ORDER — LIDOCAINE HYDROCHLORIDE 20 MG/ML
INJECTION, SOLUTION EPIDURAL; INFILTRATION; INTRACAUDAL; PERINEURAL AS NEEDED
Status: DISCONTINUED | OUTPATIENT
Start: 2017-01-01 | End: 2017-01-01 | Stop reason: HOSPADM

## 2017-01-01 RX ORDER — LEVOFLOXACIN 750 MG/1
750 TABLET ORAL EVERY 24 HOURS
Qty: 8 TAB | Refills: 0 | Status: SHIPPED | OUTPATIENT
Start: 2017-01-01 | End: 2017-01-01

## 2017-01-01 RX ORDER — DUTASTERIDE AND TAMSULOSIN HYDROCHLORIDE CAPSULES .5; .4 MG/1; MG/1
1 CAPSULE ORAL
Qty: 20 CAP | Refills: 0 | Status: SHIPPED | OUTPATIENT
Start: 2017-01-01

## 2017-01-01 RX ORDER — ROSUVASTATIN CALCIUM 10 MG/1
5 TABLET, COATED ORAL
Status: DISCONTINUED | OUTPATIENT
Start: 2017-01-01 | End: 2017-01-01 | Stop reason: HOSPADM

## 2017-01-01 RX ORDER — INSULIN LISPRO 100 [IU]/ML
INJECTION, SOLUTION INTRAVENOUS; SUBCUTANEOUS
Status: DISCONTINUED | OUTPATIENT
Start: 2017-01-01 | End: 2017-01-01 | Stop reason: SDUPTHER

## 2017-01-01 RX ORDER — TAMSULOSIN HYDROCHLORIDE 0.4 MG/1
0.4 CAPSULE ORAL DAILY
Status: ON HOLD | COMMUNITY
End: 2017-01-01

## 2017-01-01 RX ORDER — SODIUM CHLORIDE 9 MG/ML
100 INJECTION, SOLUTION INTRAVENOUS CONTINUOUS
Status: DISCONTINUED | OUTPATIENT
Start: 2017-01-01 | End: 2017-01-01

## 2017-01-01 RX ORDER — PROPOFOL 10 MG/ML
INJECTION, EMULSION INTRAVENOUS AS NEEDED
Status: DISCONTINUED | OUTPATIENT
Start: 2017-01-01 | End: 2017-01-01 | Stop reason: HOSPADM

## 2017-01-01 RX ORDER — LEVOFLOXACIN 5 MG/ML
750 INJECTION, SOLUTION INTRAVENOUS
Status: DISCONTINUED | OUTPATIENT
Start: 2017-01-01 | End: 2017-01-01

## 2017-01-01 RX ORDER — SODIUM CHLORIDE 0.9 % (FLUSH) 0.9 %
5-10 SYRINGE (ML) INJECTION AS NEEDED
Status: DISCONTINUED | OUTPATIENT
Start: 2017-01-01 | End: 2017-01-01 | Stop reason: HOSPADM

## 2017-01-01 RX ORDER — POTASSIUM CHLORIDE 750 MG/1
40 TABLET, FILM COATED, EXTENDED RELEASE ORAL
Status: COMPLETED | OUTPATIENT
Start: 2017-01-01 | End: 2017-01-01

## 2017-01-01 RX ORDER — MAGNESIUM SULFATE 100 %
16 CRYSTALS MISCELLANEOUS AS NEEDED
Status: DISCONTINUED | OUTPATIENT
Start: 2017-01-01 | End: 2017-01-01 | Stop reason: HOSPADM

## 2017-01-01 RX ORDER — DEXTROSE MONOHYDRATE AND POTASSIUM CHLORIDE 5; .298 G/100ML; G/100ML
INJECTION, SOLUTION INTRAVENOUS CONTINUOUS
Status: DISCONTINUED | OUTPATIENT
Start: 2017-01-01 | End: 2017-01-01

## 2017-01-01 RX ORDER — SODIUM CHLORIDE, SODIUM LACTATE, POTASSIUM CHLORIDE, CALCIUM CHLORIDE 600; 310; 30; 20 MG/100ML; MG/100ML; MG/100ML; MG/100ML
125 INJECTION, SOLUTION INTRAVENOUS CONTINUOUS
Status: DISCONTINUED | OUTPATIENT
Start: 2017-01-01 | End: 2017-01-01 | Stop reason: HOSPADM

## 2017-01-01 RX ORDER — DEXTROSE MONOHYDRATE 25 G/50ML
25-50 INJECTION, SOLUTION INTRAVENOUS AS NEEDED
Status: DISCONTINUED | OUTPATIENT
Start: 2017-01-01 | End: 2017-01-01 | Stop reason: HOSPADM

## 2017-01-01 RX ORDER — ONDANSETRON 2 MG/ML
INJECTION INTRAMUSCULAR; INTRAVENOUS AS NEEDED
Status: DISCONTINUED | OUTPATIENT
Start: 2017-01-01 | End: 2017-01-01 | Stop reason: HOSPADM

## 2017-01-01 RX ORDER — METOPROLOL TARTRATE 75 MG/1
75 TABLET, FILM COATED ORAL 2 TIMES DAILY
Qty: 20 TAB | Refills: 0 | Status: SHIPPED | OUTPATIENT
Start: 2017-01-01

## 2017-01-01 RX ORDER — MENTHOL AND ZINC OXIDE .44; 20.625 G/100G; G/100G
0.44 OINTMENT TOPICAL
Status: ON HOLD | COMMUNITY
End: 2017-01-01

## 2017-01-01 RX ORDER — METOPROLOL TARTRATE 50 MG/1
50 TABLET ORAL EVERY 8 HOURS
Status: DISCONTINUED | OUTPATIENT
Start: 2017-01-01 | End: 2017-01-01

## 2017-01-01 RX ORDER — METOPROLOL TARTRATE 25 MG/1
25 TABLET, FILM COATED ORAL 2 TIMES DAILY
Status: DISCONTINUED | OUTPATIENT
Start: 2017-01-01 | End: 2017-01-01

## 2017-01-01 RX ORDER — ACETAMINOPHEN 325 MG/1
650 TABLET ORAL
Status: ON HOLD | COMMUNITY
End: 2017-01-01

## 2017-01-01 RX ORDER — DOCUSATE SODIUM 100 MG/1
100 CAPSULE, LIQUID FILLED ORAL 2 TIMES DAILY
Status: DISCONTINUED | OUTPATIENT
Start: 2017-01-01 | End: 2017-01-01 | Stop reason: HOSPADM

## 2017-01-01 RX ORDER — METOPROLOL TARTRATE 5 MG/5ML
INJECTION INTRAVENOUS
Status: DISPENSED
Start: 2017-01-01 | End: 2017-01-01

## 2017-01-01 RX ORDER — SUCCINYLCHOLINE CHLORIDE 20 MG/ML
INJECTION INTRAMUSCULAR; INTRAVENOUS AS NEEDED
Status: DISCONTINUED | OUTPATIENT
Start: 2017-01-01 | End: 2017-01-01 | Stop reason: HOSPADM

## 2017-01-01 RX ORDER — DUTASTERIDE AND TAMSULOSIN HYDROCHLORIDE CAPSULES .5; .4 MG/1; MG/1
1 CAPSULE ORAL DAILY
Status: DISCONTINUED | OUTPATIENT
Start: 2017-01-01 | End: 2017-01-01

## 2017-01-01 RX ORDER — SODIUM CHLORIDE, SODIUM LACTATE, POTASSIUM CHLORIDE, CALCIUM CHLORIDE 600; 310; 30; 20 MG/100ML; MG/100ML; MG/100ML; MG/100ML
INJECTION, SOLUTION INTRAVENOUS
Status: DISCONTINUED | OUTPATIENT
Start: 2017-01-01 | End: 2017-01-01 | Stop reason: HOSPADM

## 2017-01-01 RX ORDER — DOCUSATE SODIUM 100 MG/1
100 CAPSULE, LIQUID FILLED ORAL 2 TIMES DAILY
Qty: 20 CAP | Refills: 0 | Status: SHIPPED | OUTPATIENT
Start: 2017-01-01

## 2017-01-01 RX ORDER — ACETAMINOPHEN 650 MG/1
975 SUPPOSITORY RECTAL
Status: COMPLETED | OUTPATIENT
Start: 2017-01-01 | End: 2017-01-01

## 2017-01-01 RX ORDER — ACETAMINOPHEN 325 MG/1
650 TABLET ORAL
Status: DISCONTINUED | OUTPATIENT
Start: 2017-01-01 | End: 2017-01-01 | Stop reason: HOSPADM

## 2017-01-01 RX ORDER — DEXTROSE, SODIUM CHLORIDE, AND POTASSIUM CHLORIDE 5; .45; .3 G/100ML; G/100ML; G/100ML
INJECTION INTRAVENOUS CONTINUOUS
Status: DISCONTINUED | OUTPATIENT
Start: 2017-01-01 | End: 2017-01-01

## 2017-01-01 RX ORDER — POTASSIUM CHLORIDE AND SODIUM CHLORIDE 900; 300 MG/100ML; MG/100ML
INJECTION, SOLUTION INTRAVENOUS CONTINUOUS
Status: DISCONTINUED | OUTPATIENT
Start: 2017-01-01 | End: 2017-01-01 | Stop reason: SDUPTHER

## 2017-01-01 RX ORDER — DILTIAZEM HYDROCHLORIDE 5 MG/ML
10 INJECTION INTRAVENOUS ONCE
Status: ACTIVE | OUTPATIENT
Start: 2017-01-01 | End: 2017-01-01

## 2017-01-01 RX ORDER — LEVOFLOXACIN 5 MG/ML
750 INJECTION, SOLUTION INTRAVENOUS EVERY 24 HOURS
Status: DISCONTINUED | OUTPATIENT
Start: 2017-01-01 | End: 2017-01-01

## 2017-01-01 RX ORDER — DUTASTERIDE 0.5 MG/1
0.5 CAPSULE, LIQUID FILLED ORAL DAILY
Status: DISCONTINUED | OUTPATIENT
Start: 2017-01-01 | End: 2017-01-01 | Stop reason: HOSPADM

## 2017-01-01 RX ORDER — FUROSEMIDE 40 MG/1
40 TABLET ORAL DAILY
Status: DISCONTINUED | OUTPATIENT
Start: 2017-01-01 | End: 2017-01-01

## 2017-01-01 RX ORDER — ROSUVASTATIN CALCIUM 5 MG/1
5 TABLET, COATED ORAL
Qty: 20 TAB | Refills: 0 | Status: SHIPPED | OUTPATIENT
Start: 2017-01-01

## 2017-01-01 RX ORDER — METOPROLOL TARTRATE 50 MG/1
50 TABLET ORAL EVERY 8 HOURS
Status: DISCONTINUED | OUTPATIENT
Start: 2017-01-01 | End: 2017-01-01 | Stop reason: HOSPADM

## 2017-01-01 RX ORDER — POTASSIUM CHLORIDE 1.5 G/1.77G
20 POWDER, FOR SOLUTION ORAL 2 TIMES DAILY
Qty: 20 EACH | Refills: 0 | Status: SHIPPED | OUTPATIENT
Start: 2017-01-01

## 2017-01-01 RX ORDER — ENOXAPARIN SODIUM 100 MG/ML
40 INJECTION SUBCUTANEOUS EVERY 24 HOURS
Status: DISCONTINUED | OUTPATIENT
Start: 2017-01-01 | End: 2017-01-01 | Stop reason: HOSPADM

## 2017-01-01 RX ORDER — LEVOFLOXACIN 500 MG/1
500 TABLET, FILM COATED ORAL DAILY
Qty: 5 TAB | Refills: 0 | Status: SHIPPED | OUTPATIENT
Start: 2017-01-01 | End: 2017-01-01

## 2017-01-01 RX ORDER — MEMANTINE HYDROCHLORIDE 10 MG/1
10 TABLET ORAL DAILY
Qty: 20 TAB | Refills: 0 | Status: SHIPPED | OUTPATIENT
Start: 2017-01-01

## 2017-01-01 RX ORDER — LEVOFLOXACIN 5 MG/ML
750 INJECTION, SOLUTION INTRAVENOUS EVERY 24 HOURS
Status: DISCONTINUED | OUTPATIENT
Start: 2017-01-01 | End: 2017-01-01 | Stop reason: CLARIF

## 2017-01-01 RX ORDER — FINASTERIDE 5 MG/1
5 TABLET, FILM COATED ORAL DAILY
Status: DISCONTINUED | OUTPATIENT
Start: 2017-01-01 | End: 2017-01-01 | Stop reason: HOSPADM

## 2017-01-01 RX ORDER — DEXTROSE MONOHYDRATE 50 MG/ML
100 INJECTION, SOLUTION INTRAVENOUS CONTINUOUS
Status: DISCONTINUED | OUTPATIENT
Start: 2017-01-01 | End: 2017-01-01

## 2017-01-01 RX ORDER — HYDROMORPHONE HYDROCHLORIDE 2 MG/ML
0.5 INJECTION, SOLUTION INTRAMUSCULAR; INTRAVENOUS; SUBCUTANEOUS AS NEEDED
Status: DISCONTINUED | OUTPATIENT
Start: 2017-01-01 | End: 2017-01-01 | Stop reason: HOSPADM

## 2017-01-01 RX ORDER — FENTANYL CITRATE 50 UG/ML
INJECTION, SOLUTION INTRAMUSCULAR; INTRAVENOUS AS NEEDED
Status: DISCONTINUED | OUTPATIENT
Start: 2017-01-01 | End: 2017-01-01 | Stop reason: HOSPADM

## 2017-01-01 RX ORDER — TAMSULOSIN HYDROCHLORIDE 0.4 MG/1
0.4 CAPSULE ORAL DAILY
Qty: 20 CAP | Refills: 0 | Status: SHIPPED | OUTPATIENT
Start: 2017-01-01

## 2017-01-01 RX ORDER — ASPIRIN 81 MG/1
81 TABLET ORAL DAILY
Status: DISCONTINUED | OUTPATIENT
Start: 2017-01-01 | End: 2017-01-01 | Stop reason: HOSPADM

## 2017-01-01 RX ORDER — SODIUM CHLORIDE 9 MG/ML
125 INJECTION, SOLUTION INTRAVENOUS CONTINUOUS
Status: DISCONTINUED | OUTPATIENT
Start: 2017-01-01 | End: 2017-01-01

## 2017-01-01 RX ORDER — ASPIRIN 81 MG/1
162 TABLET ORAL DAILY
Status: DISCONTINUED | OUTPATIENT
Start: 2017-01-01 | End: 2017-01-01 | Stop reason: HOSPADM

## 2017-01-01 RX ORDER — METOPROLOL TARTRATE 75 MG/1
75 TABLET, FILM COATED ORAL 2 TIMES DAILY
Qty: 60 TAB | Refills: 0 | Status: ON HOLD | OUTPATIENT
Start: 2017-01-01 | End: 2017-01-01

## 2017-01-01 RX ORDER — FUROSEMIDE 40 MG/1
40 TABLET ORAL DAILY
Qty: 20 TAB | Refills: 0 | Status: SHIPPED | OUTPATIENT
Start: 2017-01-01

## 2017-01-01 RX ORDER — LIDOCAINE HYDROCHLORIDE 20 MG/ML
JELLY TOPICAL ONCE
Status: COMPLETED | OUTPATIENT
Start: 2017-01-01 | End: 2017-01-01

## 2017-01-01 RX ORDER — POTASSIUM CHLORIDE 7.45 MG/ML
10 INJECTION INTRAVENOUS
Status: DISCONTINUED | OUTPATIENT
Start: 2017-01-01 | End: 2017-01-01

## 2017-01-01 RX ORDER — METOPROLOL TARTRATE 50 MG/1
50 TABLET ORAL EVERY 8 HOURS
Qty: 90 TAB | Refills: 0 | Status: SHIPPED | OUTPATIENT
Start: 2017-01-01 | End: 2017-01-01

## 2017-01-01 RX ORDER — FINASTERIDE 5 MG/1
5 TABLET, FILM COATED ORAL DAILY
Qty: 20 TAB | Refills: 0 | Status: SHIPPED | OUTPATIENT
Start: 2017-01-01

## 2017-01-01 RX ORDER — ONDANSETRON 2 MG/ML
4 INJECTION INTRAMUSCULAR; INTRAVENOUS ONCE
Status: DISCONTINUED | OUTPATIENT
Start: 2017-01-01 | End: 2017-01-01 | Stop reason: HOSPADM

## 2017-01-01 RX ORDER — POTASSIUM CHLORIDE 20 MEQ/1
20 TABLET, EXTENDED RELEASE ORAL ONCE
Status: COMPLETED | OUTPATIENT
Start: 2017-01-01 | End: 2017-01-01

## 2017-01-01 RX ORDER — ACETAMINOPHEN 325 MG/1
650 TABLET ORAL
Qty: 20 TAB | Refills: 0 | Status: SHIPPED | OUTPATIENT
Start: 2017-01-01

## 2017-01-01 RX ORDER — POTASSIUM CHLORIDE 750 MG/1
40 TABLET, FILM COATED, EXTENDED RELEASE ORAL
Status: DISPENSED | OUTPATIENT
Start: 2017-01-01 | End: 2017-01-01

## 2017-01-01 RX ORDER — INSULIN LISPRO 100 [IU]/ML
INJECTION, SOLUTION INTRAVENOUS; SUBCUTANEOUS
Status: DISCONTINUED | OUTPATIENT
Start: 2017-01-01 | End: 2017-01-01 | Stop reason: HOSPADM

## 2017-01-01 RX ORDER — LORAZEPAM 2 MG/ML
1 INJECTION INTRAMUSCULAR
Status: DISCONTINUED | OUTPATIENT
Start: 2017-01-01 | End: 2017-01-01 | Stop reason: HOSPADM

## 2017-01-01 RX ORDER — MAGNESIUM SULFATE 100 %
4 CRYSTALS MISCELLANEOUS AS NEEDED
Status: DISCONTINUED | OUTPATIENT
Start: 2017-01-01 | End: 2017-01-01 | Stop reason: HOSPADM

## 2017-01-01 RX ORDER — LEVOFLOXACIN 5 MG/ML
750 INJECTION, SOLUTION INTRAVENOUS EVERY 24 HOURS
Status: DISCONTINUED | OUTPATIENT
Start: 2017-01-01 | End: 2017-01-01 | Stop reason: HOSPADM

## 2017-01-01 RX ADMIN — LEVOFLOXACIN 750 MG: 5 INJECTION, SOLUTION INTRAVENOUS at 17:02

## 2017-01-01 RX ADMIN — PIPERACILLIN SODIUM,TAZOBACTAM SODIUM 4.5 G: 4; .5 INJECTION, POWDER, FOR SOLUTION INTRAVENOUS at 19:06

## 2017-01-01 RX ADMIN — METOPROLOL TARTRATE 25 MG: 25 TABLET ORAL at 09:19

## 2017-01-01 RX ADMIN — PIPERACILLIN SODIUM,TAZOBACTAM SODIUM 3.38 G: 3; .375 INJECTION, POWDER, FOR SOLUTION INTRAVENOUS at 23:56

## 2017-01-01 RX ADMIN — METOPROLOL TARTRATE 5 MG: 5 INJECTION INTRAVENOUS at 16:55

## 2017-01-01 RX ADMIN — DOCUSATE SODIUM 100 MG: 100 CAPSULE, LIQUID FILLED ORAL at 09:14

## 2017-01-01 RX ADMIN — LORAZEPAM 1 MG: 2 INJECTION INTRAMUSCULAR at 11:50

## 2017-01-01 RX ADMIN — DOCUSATE SODIUM 100 MG: 100 CAPSULE, LIQUID FILLED ORAL at 09:19

## 2017-01-01 RX ADMIN — DEXTROSE MONOHYDRATE AND POTASSIUM CHLORIDE: 5; .298 INJECTION, SOLUTION INTRAVENOUS at 10:20

## 2017-01-01 RX ADMIN — DOCUSATE SODIUM 100 MG: 100 CAPSULE, LIQUID FILLED ORAL at 09:06

## 2017-01-01 RX ADMIN — MEMANTINE HYDROCHLORIDE 10 MG: 10 TABLET ORAL at 09:56

## 2017-01-01 RX ADMIN — DEXTROSE MONOHYDRATE AND POTASSIUM CHLORIDE: 5; .298 INJECTION, SOLUTION INTRAVENOUS at 05:41

## 2017-01-01 RX ADMIN — METOPROLOL TARTRATE 75 MG: 50 TABLET ORAL at 17:36

## 2017-01-01 RX ADMIN — INSULIN LISPRO 6 UNITS: 100 INJECTION, SOLUTION INTRAVENOUS; SUBCUTANEOUS at 12:36

## 2017-01-01 RX ADMIN — METOPROLOL TARTRATE 75 MG: 50 TABLET ORAL at 09:14

## 2017-01-01 RX ADMIN — SODIUM CHLORIDE 1500 MG: 900 INJECTION, SOLUTION INTRAVENOUS at 11:46

## 2017-01-01 RX ADMIN — FUROSEMIDE 40 MG: 40 TABLET ORAL at 08:41

## 2017-01-01 RX ADMIN — DOCUSATE SODIUM 100 MG: 100 CAPSULE, LIQUID FILLED ORAL at 08:25

## 2017-01-01 RX ADMIN — ROSUVASTATIN CALCIUM 5 MG: 10 TABLET ORAL at 21:36

## 2017-01-01 RX ADMIN — FUROSEMIDE 40 MG: 40 TABLET ORAL at 09:05

## 2017-01-01 RX ADMIN — FUROSEMIDE 40 MG: 40 TABLET ORAL at 09:19

## 2017-01-01 RX ADMIN — DUTASTERIDE 0.5 MG: 0.5 CAPSULE, LIQUID FILLED ORAL at 08:30

## 2017-01-01 RX ADMIN — MEMANTINE HYDROCHLORIDE 10 MG: 10 TABLET ORAL at 09:14

## 2017-01-01 RX ADMIN — FINASTERIDE 5 MG: 5 TABLET, FILM COATED ORAL at 09:56

## 2017-01-01 RX ADMIN — DUTASTERIDE 0.5 MG: 0.5 CAPSULE, LIQUID FILLED ORAL at 09:31

## 2017-01-01 RX ADMIN — FINASTERIDE 5 MG: 5 TABLET, FILM COATED ORAL at 09:00

## 2017-01-01 RX ADMIN — PIPERACILLIN SODIUM,TAZOBACTAM SODIUM 4.5 G: 4; .5 INJECTION, POWDER, FOR SOLUTION INTRAVENOUS at 21:42

## 2017-01-01 RX ADMIN — ROSUVASTATIN CALCIUM 5 MG: 10 TABLET, COATED ORAL at 21:39

## 2017-01-01 RX ADMIN — SODIUM CHLORIDE, SODIUM LACTATE, POTASSIUM CHLORIDE, CALCIUM CHLORIDE: 600; 310; 30; 20 INJECTION, SOLUTION INTRAVENOUS at 16:34

## 2017-01-01 RX ADMIN — SODIUM CHLORIDE 75 ML/HR: 900 INJECTION, SOLUTION INTRAVENOUS at 21:41

## 2017-01-01 RX ADMIN — ASPIRIN 81 MG: 81 TABLET, COATED ORAL at 08:41

## 2017-01-01 RX ADMIN — METOPROLOL TARTRATE 50 MG: 50 TABLET ORAL at 21:46

## 2017-01-01 RX ADMIN — PIPERACILLIN SODIUM,TAZOBACTAM SODIUM 4.5 G: 4; .5 INJECTION, POWDER, FOR SOLUTION INTRAVENOUS at 04:35

## 2017-01-01 RX ADMIN — SODIUM CHLORIDE 1500 MG: 900 INJECTION, SOLUTION INTRAVENOUS at 00:53

## 2017-01-01 RX ADMIN — METOPROLOL TARTRATE 75 MG: 50 TABLET ORAL at 18:06

## 2017-01-01 RX ADMIN — INSULIN LISPRO 12 UNITS: 100 INJECTION, SOLUTION INTRAVENOUS; SUBCUTANEOUS at 11:50

## 2017-01-01 RX ADMIN — PROPOFOL 50 MG: 10 INJECTION, EMULSION INTRAVENOUS at 16:54

## 2017-01-01 RX ADMIN — ASPIRIN 81 MG: 81 TABLET, COATED ORAL at 08:30

## 2017-01-01 RX ADMIN — METOPROLOL TARTRATE 50 MG: 50 TABLET ORAL at 08:50

## 2017-01-01 RX ADMIN — SODIUM CHLORIDE 12.5 MG/HR: 900 INJECTION, SOLUTION INTRAVENOUS at 20:29

## 2017-01-01 RX ADMIN — DOCUSATE SODIUM 100 MG: 100 CAPSULE, LIQUID FILLED ORAL at 17:09

## 2017-01-01 RX ADMIN — TAMSULOSIN HYDROCHLORIDE 0.4 MG: 0.4 CAPSULE ORAL at 09:19

## 2017-01-01 RX ADMIN — ONDANSETRON 4 MG: 2 INJECTION INTRAMUSCULAR; INTRAVENOUS at 17:02

## 2017-01-01 RX ADMIN — SODIUM CHLORIDE 3387 ML: 900 INJECTION, SOLUTION INTRAVENOUS at 18:57

## 2017-01-01 RX ADMIN — LEVOFLOXACIN 750 MG: 5 INJECTION, SOLUTION INTRAVENOUS at 16:10

## 2017-01-01 RX ADMIN — ROSUVASTATIN CALCIUM 5 MG: 10 TABLET ORAL at 22:06

## 2017-01-01 RX ADMIN — ACETAMINOPHEN 975 MG: 650 SUPPOSITORY RECTAL at 15:09

## 2017-01-01 RX ADMIN — SODIUM CHLORIDE 1000 MG: 900 INJECTION, SOLUTION INTRAVENOUS at 19:24

## 2017-01-01 RX ADMIN — INSULIN LISPRO 6 UNITS: 100 INJECTION, SOLUTION INTRAVENOUS; SUBCUTANEOUS at 09:04

## 2017-01-01 RX ADMIN — NYSTATIN: 100000 POWDER TOPICAL at 08:23

## 2017-01-01 RX ADMIN — DEXTROSE MONOHYDRATE AND POTASSIUM CHLORIDE: 5; .298 INJECTION, SOLUTION INTRAVENOUS at 02:09

## 2017-01-01 RX ADMIN — DOCUSATE SODIUM 100 MG: 100 CAPSULE, LIQUID FILLED ORAL at 16:55

## 2017-01-01 RX ADMIN — PIPERACILLIN SODIUM,TAZOBACTAM SODIUM 4.5 G: 4; .5 INJECTION, POWDER, FOR SOLUTION INTRAVENOUS at 12:30

## 2017-01-01 RX ADMIN — LEVOFLOXACIN 750 MG: 5 INJECTION, SOLUTION INTRAVENOUS at 22:33

## 2017-01-01 RX ADMIN — PIPERACILLIN SODIUM,TAZOBACTAM SODIUM 2.25 G: 2; .25 INJECTION, POWDER, FOR SOLUTION INTRAVENOUS at 01:00

## 2017-01-01 RX ADMIN — INSULIN LISPRO 3 UNITS: 100 INJECTION, SOLUTION INTRAVENOUS; SUBCUTANEOUS at 12:52

## 2017-01-01 RX ADMIN — SODIUM CHLORIDE 10 MG/HR: 900 INJECTION, SOLUTION INTRAVENOUS at 20:53

## 2017-01-01 RX ADMIN — PIPERACILLIN SODIUM,TAZOBACTAM SODIUM 4.5 G: 4; .5 INJECTION, POWDER, FOR SOLUTION INTRAVENOUS at 11:53

## 2017-01-01 RX ADMIN — SODIUM CHLORIDE 2000 MG: 900 INJECTION, SOLUTION INTRAVENOUS at 22:09

## 2017-01-01 RX ADMIN — DOCUSATE SODIUM 100 MG: 100 CAPSULE, LIQUID FILLED ORAL at 17:11

## 2017-01-01 RX ADMIN — POTASSIUM CHLORIDE 40 MEQ: 750 TABLET, FILM COATED, EXTENDED RELEASE ORAL at 14:21

## 2017-01-01 RX ADMIN — TAMSULOSIN HYDROCHLORIDE 0.4 MG: 0.4 CAPSULE ORAL at 08:30

## 2017-01-01 RX ADMIN — ASPIRIN 81 MG: 81 TABLET, COATED ORAL at 09:19

## 2017-01-01 RX ADMIN — INSULIN LISPRO 3 UNITS: 100 INJECTION, SOLUTION INTRAVENOUS; SUBCUTANEOUS at 17:09

## 2017-01-01 RX ADMIN — PIPERACILLIN SODIUM,TAZOBACTAM SODIUM 3.38 G: 3; .375 INJECTION, POWDER, FOR SOLUTION INTRAVENOUS at 09:12

## 2017-01-01 RX ADMIN — ROSUVASTATIN CALCIUM 5 MG: 10 TABLET ORAL at 22:49

## 2017-01-01 RX ADMIN — METOPROLOL TARTRATE 50 MG: 50 TABLET ORAL at 09:38

## 2017-01-01 RX ADMIN — ROSUVASTATIN CALCIUM 5 MG: 10 TABLET ORAL at 21:46

## 2017-01-01 RX ADMIN — ENOXAPARIN SODIUM 40 MG: 40 INJECTION SUBCUTANEOUS at 21:42

## 2017-01-01 RX ADMIN — DUTASTERIDE 0.5 MG: 0.5 CAPSULE, LIQUID FILLED ORAL at 09:07

## 2017-01-01 RX ADMIN — TAMSULOSIN HYDROCHLORIDE 0.4 MG: 0.4 CAPSULE ORAL at 09:05

## 2017-01-01 RX ADMIN — ASPIRIN 81 MG: 81 TABLET, COATED ORAL at 09:14

## 2017-01-01 RX ADMIN — TAMSULOSIN HYDROCHLORIDE 0.4 MG: 0.4 CAPSULE ORAL at 08:18

## 2017-01-01 RX ADMIN — SODIUM CHLORIDE 10 MG/HR: 900 INJECTION, SOLUTION INTRAVENOUS at 01:32

## 2017-01-01 RX ADMIN — SODIUM CHLORIDE 1500 MG: 900 INJECTION, SOLUTION INTRAVENOUS at 12:54

## 2017-01-01 RX ADMIN — INSULIN LISPRO 3 UNITS: 100 INJECTION, SOLUTION INTRAVENOUS; SUBCUTANEOUS at 17:11

## 2017-01-01 RX ADMIN — ASPIRIN 81 MG: 81 TABLET, COATED ORAL at 08:25

## 2017-01-01 RX ADMIN — ASPIRIN 81 MG: 81 TABLET, COATED ORAL at 08:50

## 2017-01-01 RX ADMIN — METOROPROLOL TARTRATE 5 MG: 5 INJECTION, SOLUTION INTRAVENOUS at 16:42

## 2017-01-01 RX ADMIN — NYSTATIN: 100000 POWDER TOPICAL at 09:00

## 2017-01-01 RX ADMIN — INSULIN LISPRO 6 UNITS: 100 INJECTION, SOLUTION INTRAVENOUS; SUBCUTANEOUS at 07:30

## 2017-01-01 RX ADMIN — NYSTATIN: 100000 POWDER TOPICAL at 18:10

## 2017-01-01 RX ADMIN — DOCUSATE SODIUM 100 MG: 100 CAPSULE, LIQUID FILLED ORAL at 18:25

## 2017-01-01 RX ADMIN — FUROSEMIDE 40 MG: 40 TABLET ORAL at 08:25

## 2017-01-01 RX ADMIN — FENTANYL CITRATE 50 MCG: 50 INJECTION, SOLUTION INTRAMUSCULAR; INTRAVENOUS at 16:36

## 2017-01-01 RX ADMIN — SODIUM CHLORIDE 3540 ML: 900 INJECTION, SOLUTION INTRAVENOUS at 15:05

## 2017-01-01 RX ADMIN — POTASSIUM CHLORIDE 20 MEQ: 20 TABLET, EXTENDED RELEASE ORAL at 13:39

## 2017-01-01 RX ADMIN — PIPERACILLIN SODIUM,TAZOBACTAM SODIUM 3.38 G: 3; .375 INJECTION, POWDER, FOR SOLUTION INTRAVENOUS at 00:06

## 2017-01-01 RX ADMIN — DOCUSATE SODIUM 100 MG: 100 CAPSULE, LIQUID FILLED ORAL at 09:56

## 2017-01-01 RX ADMIN — ROSUVASTATIN CALCIUM 5 MG: 10 TABLET, COATED ORAL at 23:56

## 2017-01-01 RX ADMIN — INSULIN LISPRO 3 UNITS: 100 INJECTION, SOLUTION INTRAVENOUS; SUBCUTANEOUS at 09:06

## 2017-01-01 RX ADMIN — LEVOFLOXACIN 750 MG: 750 TABLET, FILM COATED ORAL at 14:57

## 2017-01-01 RX ADMIN — SODIUM CHLORIDE 1000 MG: 900 INJECTION, SOLUTION INTRAVENOUS at 17:54

## 2017-01-01 RX ADMIN — PIPERACILLIN SODIUM,TAZOBACTAM SODIUM 3.38 G: 3; .375 INJECTION, POWDER, FOR SOLUTION INTRAVENOUS at 23:54

## 2017-01-01 RX ADMIN — ROSUVASTATIN CALCIUM 5 MG: 10 TABLET ORAL at 23:05

## 2017-01-01 RX ADMIN — INSULIN LISPRO 6 UNITS: 100 INJECTION, SOLUTION INTRAVENOUS; SUBCUTANEOUS at 13:24

## 2017-01-01 RX ADMIN — LORAZEPAM 1 MG: 2 INJECTION INTRAMUSCULAR at 18:35

## 2017-01-01 RX ADMIN — METOPROLOL TARTRATE 50 MG: 50 TABLET ORAL at 08:30

## 2017-01-01 RX ADMIN — INSULIN LISPRO 6 UNITS: 100 INJECTION, SOLUTION INTRAVENOUS; SUBCUTANEOUS at 09:00

## 2017-01-01 RX ADMIN — DEXTROSE MONOHYDRATE AND POTASSIUM CHLORIDE: 5; .298 INJECTION, SOLUTION INTRAVENOUS at 16:15

## 2017-01-01 RX ADMIN — PROPOFOL 150 MG: 10 INJECTION, EMULSION INTRAVENOUS at 16:36

## 2017-01-01 RX ADMIN — ASPIRIN 162 MG: 81 TABLET, COATED ORAL at 09:00

## 2017-01-01 RX ADMIN — METOPROLOL TARTRATE 75 MG: 50 TABLET ORAL at 02:12

## 2017-01-01 RX ADMIN — PIPERACILLIN SODIUM,TAZOBACTAM SODIUM 4.5 G: 4; .5 INJECTION, POWDER, FOR SOLUTION INTRAVENOUS at 21:00

## 2017-01-01 RX ADMIN — METOROPROLOL TARTRATE 5 MG: 5 INJECTION, SOLUTION INTRAVENOUS at 14:19

## 2017-01-01 RX ADMIN — DEXTROSE MONOHYDRATE AND POTASSIUM CHLORIDE: 5; .298 INJECTION, SOLUTION INTRAVENOUS at 01:35

## 2017-01-01 RX ADMIN — DEXTROSE MONOHYDRATE, SODIUM CHLORIDE, AND POTASSIUM CHLORIDE: 50; 4.5; 2.98 INJECTION, SOLUTION INTRAVENOUS at 10:27

## 2017-01-01 RX ADMIN — METOROPROLOL TARTRATE 5 MG: 5 INJECTION, SOLUTION INTRAVENOUS at 08:56

## 2017-01-01 RX ADMIN — SODIUM CHLORIDE 1000 MG: 900 INJECTION, SOLUTION INTRAVENOUS at 16:25

## 2017-01-01 RX ADMIN — TAMSULOSIN HYDROCHLORIDE 0.4 MG: 0.4 CAPSULE ORAL at 09:56

## 2017-01-01 RX ADMIN — CALCIUM GLUCONATE 2 G: 94 INJECTION, SOLUTION INTRAVENOUS at 14:18

## 2017-01-01 RX ADMIN — PIPERACILLIN SODIUM,TAZOBACTAM SODIUM 3.38 G: 3; .375 INJECTION, POWDER, FOR SOLUTION INTRAVENOUS at 18:26

## 2017-01-01 RX ADMIN — SODIUM CHLORIDE AND POTASSIUM CHLORIDE: 9; 2.98 INJECTION, SOLUTION INTRAVENOUS at 13:38

## 2017-01-01 RX ADMIN — DOCUSATE SODIUM 100 MG: 100 CAPSULE, LIQUID FILLED ORAL at 08:30

## 2017-01-01 RX ADMIN — SODIUM CHLORIDE 2000 MG: 900 INJECTION, SOLUTION INTRAVENOUS at 21:24

## 2017-01-01 RX ADMIN — SODIUM CHLORIDE AND POTASSIUM CHLORIDE: 9; 2.98 INJECTION, SOLUTION INTRAVENOUS at 06:12

## 2017-01-01 RX ADMIN — METOPROLOL TARTRATE 25 MG: 25 TABLET ORAL at 17:02

## 2017-01-01 RX ADMIN — PIPERACILLIN SODIUM,TAZOBACTAM SODIUM 3.38 G: 3; .375 INJECTION, POWDER, FOR SOLUTION INTRAVENOUS at 17:35

## 2017-01-01 RX ADMIN — METOPROLOL TARTRATE 50 MG: 50 TABLET ORAL at 06:15

## 2017-01-01 RX ADMIN — SODIUM CHLORIDE 12.5 MG/HR: 900 INJECTION, SOLUTION INTRAVENOUS at 04:34

## 2017-01-01 RX ADMIN — ASPIRIN 81 MG: 81 TABLET, COATED ORAL at 09:56

## 2017-01-01 RX ADMIN — DOCUSATE SODIUM 100 MG: 100 CAPSULE, LIQUID FILLED ORAL at 09:05

## 2017-01-01 RX ADMIN — LEVOFLOXACIN 750 MG: 5 INJECTION, SOLUTION INTRAVENOUS at 22:27

## 2017-01-01 RX ADMIN — PIPERACILLIN SODIUM,TAZOBACTAM SODIUM 4.5 G: 4; .5 INJECTION, POWDER, FOR SOLUTION INTRAVENOUS at 04:38

## 2017-01-01 RX ADMIN — ASPIRIN 81 MG: 81 TABLET, COATED ORAL at 08:18

## 2017-01-01 RX ADMIN — FUROSEMIDE 40 MG: 40 TABLET ORAL at 08:30

## 2017-01-01 RX ADMIN — PIPERACILLIN SODIUM,TAZOBACTAM SODIUM 3.38 G: 3; .375 INJECTION, POWDER, FOR SOLUTION INTRAVENOUS at 08:18

## 2017-01-01 RX ADMIN — INSULIN LISPRO 6 UNITS: 100 INJECTION, SOLUTION INTRAVENOUS; SUBCUTANEOUS at 08:32

## 2017-01-01 RX ADMIN — FUROSEMIDE 40 MG: 40 TABLET ORAL at 08:50

## 2017-01-01 RX ADMIN — LEVOFLOXACIN 750 MG: 750 TABLET, FILM COATED ORAL at 17:10

## 2017-01-01 RX ADMIN — MEMANTINE HYDROCHLORIDE 10 MG: 10 TABLET ORAL at 08:18

## 2017-01-01 RX ADMIN — NYSTATIN: 100000 POWDER TOPICAL at 10:05

## 2017-01-01 RX ADMIN — FENTANYL CITRATE 50 MCG: 50 INJECTION, SOLUTION INTRAMUSCULAR; INTRAVENOUS at 16:54

## 2017-01-01 RX ADMIN — LIDOCAINE HYDROCHLORIDE 100 MG: 20 INJECTION, SOLUTION EPIDURAL; INFILTRATION; INTRACAUDAL; PERINEURAL at 16:36

## 2017-01-01 RX ADMIN — INSULIN LISPRO 3 UNITS: 100 INJECTION, SOLUTION INTRAVENOUS; SUBCUTANEOUS at 21:36

## 2017-01-01 RX ADMIN — Medication 10 ML: at 00:00

## 2017-01-01 RX ADMIN — METOPROLOL TARTRATE 50 MG: 50 TABLET ORAL at 23:05

## 2017-01-01 RX ADMIN — METOPROLOL TARTRATE 50 MG: 50 TABLET ORAL at 06:08

## 2017-01-01 RX ADMIN — INSULIN LISPRO 9 UNITS: 100 INJECTION, SOLUTION INTRAVENOUS; SUBCUTANEOUS at 12:55

## 2017-01-01 RX ADMIN — ACETAMINOPHEN 650 MG: 325 TABLET, FILM COATED ORAL at 18:06

## 2017-01-01 RX ADMIN — ROSUVASTATIN CALCIUM 5 MG: 10 TABLET ORAL at 21:49

## 2017-01-01 RX ADMIN — TAMSULOSIN HYDROCHLORIDE 0.4 MG: 0.4 CAPSULE ORAL at 08:25

## 2017-01-01 RX ADMIN — FINASTERIDE 5 MG: 5 TABLET, FILM COATED ORAL at 08:23

## 2017-01-01 RX ADMIN — LIDOCAINE HYDROCHLORIDE 5 ML: 20 JELLY TOPICAL at 20:10

## 2017-01-01 RX ADMIN — METOPROLOL TARTRATE 75 MG: 50 TABLET ORAL at 14:21

## 2017-01-01 RX ADMIN — DOCUSATE SODIUM 100 MG: 100 CAPSULE, LIQUID FILLED ORAL at 17:02

## 2017-01-01 RX ADMIN — SODIUM CHLORIDE 12.5 MG/HR: 900 INJECTION, SOLUTION INTRAVENOUS at 12:30

## 2017-01-01 RX ADMIN — METOPROLOL TARTRATE 50 MG: 50 TABLET ORAL at 14:57

## 2017-01-01 RX ADMIN — PIPERACILLIN SODIUM,TAZOBACTAM SODIUM 4.5 G: 4; .5 INJECTION, POWDER, FOR SOLUTION INTRAVENOUS at 15:25

## 2017-01-01 RX ADMIN — METOPROLOL TARTRATE 75 MG: 50 TABLET ORAL at 08:18

## 2017-01-01 RX ADMIN — DUTASTERIDE 0.5 MG: 0.5 CAPSULE, LIQUID FILLED ORAL at 09:55

## 2017-01-01 RX ADMIN — LEVOFLOXACIN 750 MG: 5 INJECTION, SOLUTION INTRAVENOUS at 19:56

## 2017-01-01 RX ADMIN — FINASTERIDE 5 MG: 5 TABLET, FILM COATED ORAL at 08:41

## 2017-01-01 RX ADMIN — LEVOFLOXACIN 750 MG: 5 INJECTION, SOLUTION INTRAVENOUS at 16:06

## 2017-01-01 RX ADMIN — TAMSULOSIN HYDROCHLORIDE 0.4 MG: 0.4 CAPSULE ORAL at 08:50

## 2017-01-01 RX ADMIN — PIPERACILLIN SODIUM,TAZOBACTAM SODIUM 2.25 G: 2; .25 INJECTION, POWDER, FOR SOLUTION INTRAVENOUS at 07:22

## 2017-01-01 RX ADMIN — PIPERACILLIN SODIUM,TAZOBACTAM SODIUM 3.38 G: 3; .375 INJECTION, POWDER, FOR SOLUTION INTRAVENOUS at 09:54

## 2017-01-01 RX ADMIN — METOPROLOL TARTRATE 50 MG: 50 TABLET ORAL at 13:24

## 2017-01-01 RX ADMIN — DEXTROSE MONOHYDRATE AND POTASSIUM CHLORIDE: 5; .298 INJECTION, SOLUTION INTRAVENOUS at 17:04

## 2017-01-01 RX ADMIN — DOCUSATE SODIUM 100 MG: 100 CAPSULE, LIQUID FILLED ORAL at 08:41

## 2017-01-01 RX ADMIN — METOPROLOL TARTRATE 50 MG: 50 TABLET ORAL at 16:10

## 2017-01-01 RX ADMIN — LEVOFLOXACIN 750 MG: 5 INJECTION, SOLUTION INTRAVENOUS at 15:39

## 2017-01-01 RX ADMIN — DOCUSATE SODIUM 100 MG: 100 CAPSULE, LIQUID FILLED ORAL at 17:36

## 2017-01-01 RX ADMIN — METOPROLOL TARTRATE 75 MG: 50 TABLET ORAL at 09:56

## 2017-01-01 RX ADMIN — DOCUSATE SODIUM 100 MG: 100 CAPSULE, LIQUID FILLED ORAL at 08:18

## 2017-01-01 RX ADMIN — TAMSULOSIN HYDROCHLORIDE 0.4 MG: 0.4 CAPSULE ORAL at 11:57

## 2017-01-01 RX ADMIN — SODIUM CHLORIDE 125 ML/HR: 900 INJECTION, SOLUTION INTRAVENOUS at 11:13

## 2017-01-01 RX ADMIN — ROSUVASTATIN CALCIUM 5 MG: 10 TABLET, COATED ORAL at 22:24

## 2017-01-01 RX ADMIN — METOPROLOL TARTRATE 50 MG: 50 TABLET ORAL at 21:36

## 2017-01-01 RX ADMIN — INSULIN LISPRO 3 UNITS: 100 INJECTION, SOLUTION INTRAVENOUS; SUBCUTANEOUS at 16:30

## 2017-01-01 RX ADMIN — METOPROLOL TARTRATE 25 MG: 25 TABLET ORAL at 08:41

## 2017-01-01 RX ADMIN — SODIUM CHLORIDE 15 MG/HR: 900 INJECTION, SOLUTION INTRAVENOUS at 03:04

## 2017-01-01 RX ADMIN — INSULIN LISPRO 3 UNITS: 100 INJECTION, SOLUTION INTRAVENOUS; SUBCUTANEOUS at 00:27

## 2017-01-01 RX ADMIN — INSULIN LISPRO 9 UNITS: 100 INJECTION, SOLUTION INTRAVENOUS; SUBCUTANEOUS at 22:05

## 2017-01-01 RX ADMIN — SUCCINYLCHOLINE CHLORIDE 100 MG: 20 INJECTION INTRAMUSCULAR; INTRAVENOUS at 16:36

## 2017-01-01 RX ADMIN — SODIUM CHLORIDE 100 ML/HR: 900 INJECTION, SOLUTION INTRAVENOUS at 00:52

## 2017-01-01 RX ADMIN — INSULIN LISPRO 2 UNITS: 100 INJECTION, SOLUTION INTRAVENOUS; SUBCUTANEOUS at 09:55

## 2017-01-01 RX ADMIN — SODIUM CHLORIDE 5 MG/HR: 900 INJECTION, SOLUTION INTRAVENOUS at 14:26

## 2017-01-01 RX ADMIN — TAMSULOSIN HYDROCHLORIDE 0.4 MG: 0.4 CAPSULE ORAL at 09:06

## 2017-01-01 RX ADMIN — PIPERACILLIN SODIUM,TAZOBACTAM SODIUM 4.5 G: 4; .5 INJECTION, POWDER, FOR SOLUTION INTRAVENOUS at 03:06

## 2017-01-01 RX ADMIN — PIPERACILLIN SODIUM,TAZOBACTAM SODIUM 4.5 G: 4; .5 INJECTION, POWDER, FOR SOLUTION INTRAVENOUS at 21:30

## 2017-01-01 RX ADMIN — ASPIRIN 162 MG: 81 TABLET, COATED ORAL at 09:05

## 2017-01-01 RX ADMIN — METOPROLOL TARTRATE 75 MG: 50 TABLET ORAL at 18:26

## 2017-01-01 RX ADMIN — TAMSULOSIN HYDROCHLORIDE 0.4 MG: 0.4 CAPSULE ORAL at 09:14

## 2017-01-01 RX ADMIN — LEVOFLOXACIN 750 MG: 5 INJECTION, SOLUTION INTRAVENOUS at 15:46

## 2017-01-01 RX ADMIN — PIPERACILLIN SODIUM,TAZOBACTAM SODIUM 3.38 G: 3; .375 INJECTION, POWDER, FOR SOLUTION INTRAVENOUS at 18:02

## 2017-01-01 RX ADMIN — INSULIN LISPRO 3 UNITS: 100 INJECTION, SOLUTION INTRAVENOUS; SUBCUTANEOUS at 22:04

## 2017-06-12 PROBLEM — N39.0 UTI (URINARY TRACT INFECTION): Status: ACTIVE | Noted: 2017-01-01

## 2017-06-12 PROBLEM — A41.9 SEPSIS (HCC): Status: ACTIVE | Noted: 2017-01-01

## 2017-06-12 NOTE — ED PROVIDER NOTES
HPI Comments: 3:05 PM Jakob Matos is a 67 y.o. male with a history of HTN, Hyperlipidemia, Alzheimer's, CAD, and Heart disease who presents to the emergency department c/o AMS. Per EMS the pt is usually more interactive with staff, but today did not want to eat today and just lay in the bed. Shivering upon arrival but felt hot. Pulse battling hard, and elevated blood sugar en route. No other concerns at this time. PCP: Lisa Amaya MD      The history is provided by the EMS personnel. Past Medical History:   Diagnosis Date    Alzheimer's dementia without behavioral disturbance     BPH (benign prostatic hypertrophy)     CAD (coronary artery disease)     Delirium superimposed on dementia     Elevated PSA     Esophageal reflux     Essential hypertension     Heart disease     Hematuria     Hyperlipidemia     Kidney stone     Lower urinary tract symptoms (LUTS)     Neurogenic bladder     Organic brain syndrome     mild    Prostate disease     Thrombocytopenia ([address]     Vision decreased        Past Surgical History:   Procedure Laterality Date    CARDIAC SURG PROCEDURE UNLIST      HX COLONOSCOPY      x 3.      HX HEART CATHETERIZATION  09/23/2013    [address] and 2013    HX TONSILLECTOMY           No family history on file. Social History     Social History    Marital status:      Spouse name: N/A    Number of children: N/A    Years of education: N/A     Occupational History    Not on file. Social History Main Topics    Smoking status: Never Smoker    Smokeless tobacco: Never Used    Alcohol use No    Drug use: No    Sexual activity: Not on file     Other Topics Concern    Not on file     Social History Narrative         ALLERGIES: Review of patient's allergies indicates no known allergies.     Review of Systems   Unable to perform ROS: Mental status change       Vitals:    06/12/17 1845 06/12/17 1850 06/12/17 1855 06/12/17 1900   BP: 100/65 103/57 93/64 103/59   Pulse: (!) 116 (!) 117 (!) 114 (!) 116   Resp: 15 17 15 16   Temp: (!) 38 °F (3.3 °C) (!) 38 °F (3.3 °C) (!) 37.9 °F (3.3 °C) (!) 37.9 °F (3.3 °C)   SpO2: 99% 99% 99% 98%   Weight:       Height:                Physical Exam   Constitutional: He is oriented to person, place, and time. He appears well-developed and well-nourished. No distress. HENT:   Head: Normocephalic and atraumatic. Right Ear: External ear normal.   Left Ear: External ear normal.   Nose: Nose normal.   Mouth/Throat: Oropharynx is clear and moist.   Eyes: Conjunctivae and EOM are normal. Pupils are equal, round, and reactive to light. No scleral icterus. Neck: Normal range of motion. Neck supple. No JVD present. No tracheal deviation present. No Brudzinski's sign and no Kernig's sign noted. No thyromegaly present. Cardiovascular: Normal rate, regular rhythm, normal heart sounds and intact distal pulses. Exam reveals no gallop and no friction rub. No murmur heard. Pulmonary/Chest: Effort normal and breath sounds normal. He exhibits no tenderness. Abdominal: Soft. Bowel sounds are normal. He exhibits no distension. There is tenderness. There is no rebound and no guarding. Musculoskeletal: Normal range of motion. He exhibits no edema or tenderness. Lymphadenopathy:     He has no cervical adenopathy. Neurological: He is alert and oriented to person, place, and time. No cranial nerve deficit. Coordination normal.   No sensory loss, Gait normal, Motor 5/5. Confused   Skin: Skin is warm and dry. Psychiatric: He has a normal mood and affect. His behavior is normal. Judgment and thought content normal.   Nursing note and vitals reviewed.        MDM  Number of Diagnoses or Management Options  Atrial fibrillation, unspecified type [facility]):   Fever, unspecified fever cause:   Pyelonephritis:   Sepsis, due to unspecified organism [facility]):   Tachycardia:   Diagnosis management comments: Pt with heart rate of 160's to 170's irregular, narrow complex less likely ventricular tachycardia. More concerned with septic patient, and high temperature. Will get CT brain scan for confused state of mind, and CT of abdomen for tenderness on exam. Will monitor pt after Tylenol and fluids. Pt will need to be admitted to ICU.      Critical Care  Total time providing critical care: 30-74 minutes    ED Course       Procedures        Vitals:  Patient Vitals for the past 12 hrs:   Temp Pulse Resp BP SpO2   06/12/17 1900 (!) 37.9 °F (3.3 °C) (!) 116 16 103/59 98 %   06/12/17 1855 (!) 37.9 °F (3.3 °C) (!) 114 15 93/64 99 %   06/12/17 1850 (!) 38 °F (3.3 °C) (!) 117 17 103/57 99 %   06/12/17 1845 (!) 38 °F (3.3 °C) (!) 116 15 100/65 99 %   06/12/17 1840 (!) 38.1 °F (3.4 °C) (!) 116 16 99/60 98 %   06/12/17 1835 (!) 38.1 °F (3.4 °C) - - - -   06/12/17 1830 (!) 38.1 °F (3.4 °C) - - - -   06/12/17 1825 (!) 38.1 °F (3.4 °C) (!) 125 22 101/65 99 %   06/12/17 1820 (!) 38.2 °F (3.4 °C) (!) 124 17 101/46 99 %   06/12/17 1815 (!) 38.2 °F (3.4 °C) (!) 126 18 (!) 88/66 98 %   06/12/17 1810 (!) 38.2 °F (3.4 °C) (!) 133 20 108/62 97 %   06/12/17 1805 (!) 38.3 °F (3.5 °C) (!) 123 16 99/57 97 %   06/12/17 1800 (!) 38.3 °F (3.5 °C) (!) 125 17 95/56 98 %   06/12/17 1755 (!) 38.4 °F (3.6 °C) (!) 127 18 108/61 97 %   06/12/17 1750 (!) 38.5 °F (3.6 °C) (!) 136 21 113/56 97 %   06/12/17 1745 (!) 38.5 °F (3.6 °C) (!) 131 17 96/52 98 %   06/12/17 1740 (!) 38.6 °F (3.7 °C) (!) 130 18 96/53 98 %   06/12/17 1735 (!) 38.6 °F (3.7 °C) (!) 136 20 97/49 97 %   06/12/17 1730 (!) 38.7 °F (3.7 °C) (!) 134 21 95/59 95 %   06/12/17 1725 - - - 99/54 -   06/12/17 1645 (!) 39.2 °F (4 °C) (!) 138 21 111/69 97 %   06/12/17 1640 (!) 39.3 °F (4.1 °C) (!) 137 21 115/71 97 %   06/12/17 1635 (!) 39.3 °F (4.1 °C) (!) 141 27 116/59 97 %   06/12/17 1632 (!) 39.4 °F (4.1 °C) (!) 145 28 105/63 97 %   06/12/17 1630 (!) 39.4 °F (4.1 °C) (!) 140 26 106/61 98 %   06/12/17 1620 (!) 39.6 °F (4.2 °C) (!) 148 26 114/84 97 %   06/12/17 1615 (!) 39.6 °F (4.2 °C) (!) 150 28 131/74 97 %   06/12/17 1610 (!) 39.7 °F (4.3 °C) (!) 152 26 - 96 %   06/12/17 1605 (!) 39.7 °F (4.3 °C) (!) 149 27 (!) 113/100 97 %   06/12/17 1600 (!) 39.8 °F (4.3 °C) (!) 148 28 131/69 97 %   06/12/17 1555 (!) 39.8 °F (4.3 °C) (!) 155 30 137/82 98 %   06/12/17 1550 (!) 39.9 °F (4.4 °C) (!) 149 30 153/73 98 %   06/12/17 1545 (!) 40 °F (4.4 °C) (!) 153 30 135/86 98 %   06/12/17 1540 (!) 40.1 °F (4.5 °C) (!) 157 (!) 32 147/86 98 %   06/12/17 1535 (!) 40.1 °F (4.5 °C) - - - -   06/12/17 1530 (!) 40.2 °F (4.6 °C) - - - -   06/12/17 1525 (!) 40.1 °F (4.5 °C) (!) 158 27 (!) 178/93 97 %   06/12/17 1520 - (!) 166 30 (!) 170/96 97 %   06/12/17 1518 (!) 105 °F (40.6 °C) - - - -   06/12/17 1515 - (!) 165 29 (!) 191/138 100 %   06/12/17 1510 - (!) 174 28 (!) 166/98 100 %   06/12/17 1500 - (!) 174 26 156/87 100 %       EKG interpretation by ED Physician:  173 bpm, A-fib, QTC of 556, inferior and lateral wall infarct present. X-Ray, CT or other radiology findings or impressions:  XR CHEST PORT   Final Result   Impression:  Hypoventilatory changes with cardiomegaly. No focal consolidation   CT HEAD WO CONT    (Results Pending)   CT ABD PELV WO CONT    (Results Pending)       Progress notes, Consult notes or additional Procedure notes:   6:10 PM Consult:  Discussed care with Dr. Nayeli Cuello, Standard discussion; including history of patients chief complaint, available diagnostic results, and treatment course. 6:10 PM Reevaluated pt, urine with UTI currently being treated as sepsis. Heart rate 120 to 130 much improved when pt came in. Pt needs to go to ICU, Dr. Nayeli Cuello agrees. Disposition:  Diagnosis:   1. Sepsis, due to unspecified organism ([facility] [address]    2. Pyelonephritis    3. Fever, unspecified fever cause    4. Tachycardia    5.  Atrial fibrillation, unspecified type ([address]      Disposition: adm     Scribe Im Wingert 86 scribing for and in presence of Alex Lexa Bear ([id number])      Physician Attestation  I personally preformed the services described in this documentation, reviewed and edited the documentation which was dictated to the scribe in my presence, and it accurately records my words and actions.      Najma Mena MD ([id number])      Signed by: Remi Blanc, 17, 3:04 PM    [facility] Sepsis Core Measures      Name: Beatriz Wang   : 1940   MRN: [id number]   Date: 2017   Time:       7:27 PM      Completed physical exam:yes    Latest lactic acid: No results found for: LAC    Vital Signs:    Visit Vitals    /59    Pulse (!) 116    Temp (!) 37.9 °F (3.3 °C)    Resp 16    Ht 6' 2\" (1.88 m)    Wt 118 kg (260 lb 2.3 oz)    SpO2 98%    BMI 33.4 kg/m2               Temp (24hrs), Av.7 °F (4.8 °C), Min:37.9 °F (3.3 °C), Max:105 °F (40.6 °C)       Patient Vitals for the past 8 hrs:   Temp Pulse Resp BP SpO2   17 1900 (!) 37.9 °F (3.3 °C) (!) 116 16 103/59 98 %   17 1855 (!) 37.9 °F (3.3 °C) (!) 114 15 93/64 99 %   17 1850 (!) 38 °F (3.3 °C) (!) 117 17 103/57 99 %   17 1845 (!) 38 °F (3.3 °C) (!) 116 15 100/65 99 %   17 1840 (!) 38.1 °F (3.4 °C) (!) 116 16 99/60 98 %   17 1835 (!) 38.1 °F (3.4 °C) - - - -   17 1830 (!) 38.1 °F (3.4 °C) - - - -   17 1825 (!) 38.1 °F (3.4 °C) (!) 125 22 101/65 99 %   17 1820 (!) 38.2 °F (3.4 °C) (!) 124 17 101/46 99 %   17 1815 (!) 38.2 °F (3.4 °C) (!) 126 18 (!) 88/66 98 %   17 1810 (!) 38.2 °F (3.4 °C) (!) 133 20 108/62 97 %   17 1805 (!) 38.3 °F (3.5 °C) (!) 123 16 99/57 97 %   17 1800 (!) 38.3 °F (3.5 °C) (!) 125 17 95/56 98 %   17 1755 (!) 38.4 °F (3.6 °C) (!) 127 18 108/61 97 %   17 1750 (!) 38.5 °F (3.6 °C) (!) 136 21 113/56 97 %   17 1745 (!) 38.5 °F (3.6 °C) (!) 131 17 96/52 98 %   17 1740 (!) 38.6 °F (3.7 °C) (!) 130 18 96/53 98 %   17 1735 (!) 38.6 °F (3.7 °C) (!) 136 20 97/49 97 %   06/12/17 1730 (!) 38.7 °F (3.7 °C) (!) 134 21 95/59 95 %   06/12/17 1725 - - - 99/54 -   06/12/17 1645 (!) 39.2 °F (4 °C) (!) 138 21 111/69 97 %   06/12/17 1640 (!) 39.3 °F (4.1 °C) (!) 137 21 115/71 97 %   06/12/17 1635 (!) 39.3 °F (4.1 °C) (!) 141 27 116/59 97 %   06/12/17 1632 (!) 39.4 °F (4.1 °C) (!) 145 28 105/63 97 %   06/12/17 1630 (!) 39.4 °F (4.1 °C) (!) 140 26 106/61 98 %   06/12/17 1620 (!) 39.6 °F (4.2 °C) (!) 148 26 114/84 97 %   06/12/17 1615 (!) 39.6 °F (4.2 °C) (!) 150 28 131/74 97 %   06/12/17 1610 (!) 39.7 °F (4.3 °C) (!) 152 26 - 96 %   06/12/17 1605 (!) 39.7 °F (4.3 °C) (!) 149 27 (!) 113/100 97 %   06/12/17 1600 (!) 39.8 °F (4.3 °C) (!) 148 28 131/69 97 %   06/12/17 1555 (!) 39.8 °F (4.3 °C) (!) 155 30 137/82 98 %   06/12/17 1550 (!) 39.9 °F (4.4 °C) (!) 149 30 153/73 98 %   06/12/17 1545 (!) 40 °F (4.4 °C) (!) 153 30 135/86 98 %   06/12/17 1540 (!) 40.1 °F (4.5 °C) (!) 157 (!) 32 147/86 98 %   06/12/17 1535 (!) 40.1 °F (4.5 °C) - - - -   06/12/17 1530 (!) 40.2 °F (4.6 °C) - - - -   06/12/17 1525 (!) 40.1 °F (4.5 °C) (!) 158 27 (!) 178/93 97 %   06/12/17 1520 - (!) 166 30 (!) 170/96 97 %   06/12/17 1518 (!) 105 °F (40.6 °C) - - - -   06/12/17 1515 - (!) 165 29 (!) 191/138 100 %   06/12/17 1510 - (!) 174 28 (!) 166/98 100 %   06/12/17 1500 - (!) 174 26 156/87 100 %         Physical Examination:  General:  awake, much improved in responsive   Heart[de-identified]  regular rate and rhythm   Lungs:  normal ; breath sounds clear and equal bilaterally   Skin: Warm and dry. no hyperpigmentation, vitiligo, or suspicious lesions   Peripheral Pulse: Left,right   Capillary refill: normal       Completed IVF Resuscitation (30ml/kg) - yes  IVF choice: NS    Suspected source/s of severe sepsis:UTI    Organ dysfunction:   Antibiotics: Shelli Yeager MD    7:36 PM : Pt care transferred to Dr. Kandi Recio ,ED provider.  History of patient complaint(s), available diagnostic reports and current treatment plan has been discussed thoroughly.    Intended disposition of patient : ADMIT

## 2017-06-12 NOTE — ED NOTES
Patient returned to room from CT. No acute distress noted. Has become more alert and appropriately responsive.

## 2017-06-12 NOTE — ED NOTES
The Sepsis Screening has been completed on arrival in the Emergency Department.     Vital signs:  Patient Vitals for the past 4 hrs:   Temp Pulse Resp BP SpO2   06/12/17 1635 (!) 39.3 °F (4.1 °C) (!) 141 27 116/59 97 %   06/12/17 1632 (!) 39.4 °F (4.1 °C) (!) 145 28 105/63 97 %   06/12/17 1630 (!) 39.4 °F (4.1 °C) (!) 140 26 106/61 98 %   06/12/17 1620 (!) 39.6 °F (4.2 °C) (!) 148 26 114/84 97 %   06/12/17 1615 (!) 39.6 °F (4.2 °C) (!) 150 28 131/74 97 %   06/12/17 1610 (!) 39.7 °F (4.3 °C) (!) 152 26 - 96 %   06/12/17 1605 (!) 39.7 °F (4.3 °C) (!) 149 27 (!) 113/100 97 %   06/12/17 1600 (!) 39.8 °F (4.3 °C) (!) 148 28 131/69 97 %   06/12/17 1555 (!) 39.8 °F (4.3 °C) (!) 155 30 137/82 98 %   06/12/17 1550 (!) 39.9 °F (4.4 °C) (!) 149 30 153/73 98 %   06/12/17 1545 (!) 40 °F (4.4 °C) (!) 153 30 135/86 98 %   06/12/17 1540 (!) 40.1 °F (4.5 °C) (!) 157 (!) 32 147/86 98 %   06/12/17 1535 (!) 40.1 °F (4.5 °C) - - - -   06/12/17 1530 (!) 40.2 °F (4.6 °C) - - - -   06/12/17 1525 (!) 40.1 °F (4.5 °C) (!) 158 27 (!) 178/93 97 %   06/12/17 1520 - (!) 166 30 (!) 170/96 97 %   06/12/17 1518 (!) 105 °F (40.6 °C) - - - -   06/12/17 1515 - (!) 165 29 (!) 191/138 100 %   06/12/17 1510 - (!) 174 28 (!) 166/98 100 %   06/12/17 1500 - (!) 174 26 156/87 100 %       MAP (Monitor): 72    =monitored (data validate)       =calculated (manual entry)    Is patient is 29y/o or older, meets 2 or more ABNORMAL VITAL SIGNS below, associated with SUSPECTED INFECTION?  YES     Temperature < 96.8°F (36°C) or > 100.9°F (38.3°C)   Heart Rate > 90 beats per minute   Respiratory Rate > 20 beats per minute   BP < 90 systolic    MAP < 65    IF ANSWER IS YES, CALL A CODE SEPSIS  POC LACTIC ACID ASAP AND BEGIN NURSE DRIVEN SEPSIS ORDERS WHILE AWAITING PROVIDER

## 2017-06-12 NOTE — ED NOTES
Report to KATHRINE Norton. SEPSIS SUMMARY    · TIME ZERO (time of recognition): 2231  · 2 Sets Blood Cultures Drawn: YES  · Initial POC Lactic Drawn:  YES      · Repeat POC Lactic acid needed? YES    REPEAT POC LACTIC ACID ALWAYS REQUIRED IF INITIAL POC GREATER THAN 2  · POC lactic acid results      Lab Results   Component Value Date    LACPOC 1.2 06/12/2017    LACPO 4.9 ([facility]) 06/12/2017       · First Antibiotic started within 30 min of TIME ZERO starting with MONOTHERAPY first? YES  · All antibiotics ordered started within first 3 hours of TIME ZERO? YES  · Target fluid bolus 30ml/kg initiated for Lactic Acid > 4 or SYS < 90 and/or MAP < 65 x 1 reading? YES  · ED Provider . [id number] completed within 3-6 hours of time zero or prior to admission?  YES    Vital signs:  Patient Vitals for the past 4 hrs:   Temp Pulse Resp BP SpO2   06/12/17 1930 (!) 37.7 °F (3.2 °C) (!) 112 16 95/61 100 %   06/12/17 1925 (!) 37.7 °F (3.2 °C) (!) 117 18 108/62 100 %   06/12/17 1920 (!) 37.7 °F (3.2 °C) (!) 118 21 107/71 99 %   06/12/17 1915 (!) 37.8 °F (3.2 °C) (!) 118 19 98/67 100 %   06/12/17 1910 (!) 37.8 °F (3.2 °C) (!) 115 22 106/60 99 %   06/12/17 1905 (!) 37.8 °F (3.2 °C) (!) 115 15 99/66 99 %   06/12/17 1900 (!) 37.9 °F (3.3 °C) (!) 116 16 103/59 98 %   06/12/17 1855 (!) 37.9 °F (3.3 °C) (!) 114 15 93/64 99 %   06/12/17 1850 (!) 38 °F (3.3 °C) (!) 117 17 103/57 99 %   06/12/17 1845 (!) 38 °F (3.3 °C) (!) 116 15 100/65 99 %   06/12/17 1840 (!) 38.1 °F (3.4 °C) (!) 116 16 99/60 98 %   06/12/17 1835 (!) 38.1 °F (3.4 °C) - - - -   06/12/17 1830 (!) 38.1 °F (3.4 °C) - - - -   06/12/17 1825 (!) 38.1 °F (3.4 °C) (!) 125 22 101/65 99 %   06/12/17 1820 (!) 38.2 °F (3.4 °C) (!) 124 17 101/46 99 %   06/12/17 1815 (!) 38.2 °F (3.4 °C) (!) 126 18 (!) 88/66 98 %   06/12/17 1810 (!) 38.2 °F (3.4 °C) (!) 133 20 108/62 97 %   06/12/17 1805 (!) 38.3 °F (3.5 °C) (!) 123 16 99/57 97 %   06/12/17 1800 (!) 38.3 °F (3.5 °C) (!) 125 17 95/56 98 % 06/12/17 1755 (!) 38.4 °F (3.6 °C) (!) 127 18 108/61 97 %   06/12/17 1750 (!) 38.5 °F (3.6 °C) (!) 136 21 113/56 97 %   06/12/17 1745 (!) 38.5 °F (3.6 °C) (!) 131 17 96/52 98 %   06/12/17 1740 (!) 38.6 °F (3.7 °C) (!) 130 18 96/53 98 %   06/12/17 1735 (!) 38.6 °F (3.7 °C) (!) 136 20 97/49 97 %   06/12/17 1730 (!) 38.7 °F (3.7 °C) (!) 134 21 95/59 95 %   06/12/17 1725 - - - 99/54 -   06/12/17 1645 (!) 39.2 °F (4 °C) (!) 138 21 111/69 97 %   06/12/17 1640 (!) 39.3 °F (4.1 °C) (!) 137 21 115/71 97 %   06/12/17 1635 (!) 39.3 °F (4.1 °C) (!) 141 27 116/59 97 %   06/12/17 1632 (!) 39.4 °F (4.1 °C) (!) 145 28 105/63 97 %   06/12/17 1630 (!) 39.4 °F (4.1 °C) (!) 140 26 106/61 98 %   06/12/17 1620 (!) 39.6 °F (4.2 °C) (!) 148 26 114/84 97 %   06/12/17 1615 (!) 39.6 °F (4.2 °C) (!) 150 28 131/74 97 %   06/12/17 1610 (!) 39.7 °F (4.3 °C) (!) 152 26 - 96 %   06/12/17 1605 (!) 39.7 °F (4.3 °C) (!) 149 27 (!) 113/100 97 %   06/12/17 1600 (!) 39.8 °F (4.3 °C) (!) 148 28 131/69 97 %   06/12/17 1555 (!) 39.8 °F (4.3 °C) (!) 155 30 137/82 98 %   06/12/17 1550 (!) 39.9 °F (4.4 °C) (!) 149 30 153/73 98 %   06/12/17 1545 (!) 40 °F (4.4 °C) (!) 153 30 135/86 98 %   06/12/17 1540 (!) 40.1 °F (4.5 °C) (!) 157 (!) 32 147/86 98 %       MAP (Monitor): 70    =monitored (data validate)       =calculated (manual entry)  Initial Lactic  >4  Second Lactic  1.7    Additional Labs:    WBC:    Lab Results   Component Value Date/Time    WBC 4.3 06/12/2017 03:30 PM     Bilirubin:    Lab Results   Component Value Date/Time    Bilirubin, total 1.9 06/12/2017 03:30 PM    Bilirubin NEGATIVE  06/12/2017 03:18 PM     INR:  Lab Results   Component Value Date/Time    INR 1.1 06/12/2017 03:30 PM    Prothrombin time 13.8 06/12/2017 03:30 PM     Creatinine:  Lab Results   Component Value Date/Time    Creatinine 1.81 06/12/2017 03:30 PM     Platelet Count:    Lab Results   Component Value Date/Time    PLATELET 62 85/54/1900 03:30 PM       SEPSIS CRITERIA MET? YES    Sepsis=2 or more SIRS criteria + infection  SIRS Criteria:   Temperature < 96.8°F (36°C) or > 100.9°F (38.3°C)    Heart Rate > 90 beats per minute    Respiratory Rate > 20 beats per minute    WBC count > 12,000 or <4,000 or > 10% bands     SEVERE SEPSIS CRITERIA MET? NO   SIRS criteria Met? NO   Documented source of infection? NO   Evidence of Organ Dysfunction? NO    Severe Sepsis = SIRS Criteria + Source of Infection + Organ Dysfunction  Organ Dysfunction is met when the patient has new onset any of the following:   SBP<90 or MAP<65 or decrease in SBP more than 40 points from baseline    Bilirubin>2    INR>1.5 or aPTT>60    Creatinine>2 or UO<0.5 ml/kg/hr for 2 hours    Platelet count < 265,726    Lactate >2    Acute Respiratory Failure (BiPap/CPAP/Ventilator)    SEPTIC SHOCK CRITERIA MET? NO   Severe Sepsis criteria met? NO   Initial Lactic Acid > 4? NO   Persistent hypotension after 30ml/kg fluid bolus completion?  NO    Septic Shock = Severe Sepsis + Any of the following   Initial Lactate > 4    Persistent hypotension defined as 2 consecutive systolic BP's less than 90 and/or MAP less 65 within the first hour after fluid bolus completion

## 2017-06-12 NOTE — IP AVS SNAPSHOT
Current Discharge Medication List  
  
ASK your doctor about these medications Dose & Instructions Dispensing Information Comments Morning Noon Evening Bedtime  
 aspirin 81 mg tablet Your last dose was: Your next dose is:    
   
   
 Dose:  81 mg Take 81 mg by mouth. Refills:  0  
     
   
   
   
  
 CRESTOR 5 mg tablet Generic drug:  rosuvastatin Your last dose was: Your next dose is: Take  by mouth nightly. Refills:  0  
     
   
   
   
  
 docusate sodium 100 mg capsule Commonly known as:  Carter Portillo Your last dose was: Your next dose is:    
   
   
 Dose:  100 mg Take 100 mg by mouth two (2) times a day. Refills:  0 Dutasteride-Tamsulosin 0.5-0.4 mg Cm24 Commonly known as:  Thu Leos Your last dose was: Your next dose is:    
   
   
 Dose:  1 Cap Take 1 Cap by mouth daily (after dinner). Quantity:  90 Cap Refills:  4  
     
   
   
   
  
 finasteride 5 mg tablet Commonly known as:  PROSCAR Your last dose was: Your next dose is:    
   
   
 Dose:  5 mg Take 5 mg by mouth daily. Refills:  0  
     
   
   
   
  
 furosemide 40 mg tablet Commonly known as:  LASIX Your last dose was: Your next dose is: Take  by mouth daily. Refills:  0  
     
   
   
   
  
 metoprolol tartrate 25 mg tablet Commonly known as:  LOPRESSOR Your last dose was: Your next dose is: Take  by mouth two (2) times a day. Refills:  0  
     
   
   
   
  
 NAMENDA 10 mg tablet Generic drug:  memantine Your last dose was: Your next dose is: Take  by mouth daily. Refills:  0  
     
   
   
   
  
 potassium chloride 20 mEq packet Commonly known as:  KLOR-CON Your last dose was: Your next dose is:    
   
   
 Dose:  20 mEq Take 20 mEq by mouth two (2) times a day. Refills:  0

## 2017-06-12 NOTE — PROGRESS NOTES
[facility] Dosing Services Update Note     Pharmacy dosing Vancomycin IV empirically for treatment of sepsis of unknown origin     Day of Therapy: 0    Loading dose of 2gm IV x1    Labs:   Bun/Serum Creatinine - 41 mg/dl / 1.81 mg/dl  CrCl - 46.7 ml/min    Plan: Goal trough 15-20. Initiate 1.5 g IV q24hrs. Draw trough on 6/15. Pharmacy to follow and will make changes to dose and/or frequency based on clinical status. Thanks for the consult.

## 2017-06-12 NOTE — IP AVS SNAPSHOT
[address] [place] [address] 
[phone] Patient: Emelyn Shaver MRN: [id number] :1940 You are allergic to the following No active allergies Recent Documentation Height Weight BMI Smoking Status 1.88 m 120.2 kg 34.02 kg/m2 Never Smoker Unresulted Labs Order Current Status CULTURE, BLOOD Preliminary result CULTURE, BLOOD Preliminary result Emergency Contacts Name Discharge Info Relation Home Work Mobile Irsrinivas Young  Spouse [3] [phone] Dorota Reach [22] [phone] About your hospitalization You were admitted on:  2017 You last received care in the:  [address] [place], [place] [address] You were discharged on:  2017 Unit phone number:  [phone] Why you were hospitalized Your primary diagnosis was:  Severe Sepsis (Hcc) Your diagnoses also included:  Uti (Urinary Tract Infection), Bacteremia, Essential Hypertension, Bph (Benign Prostatic Hypertrophy), Hyperlipidemia, Alzheimer's Dementia Without Behavioral Disturbance, Cad (Coronary Artery Disease), Atrial Fibrillation With Rapid Ventricular Response (Hcc), Hypokalemia, Acute Kidney Failure (Hcc), Hypernatremia, Obesity (Bmi 44.4-58.9), Acute Metabolic Encephalopathy, Lactic Acidosis, Thrombocytopenia (Hcc), Recurrent Nephrolithiasis Providers Seen During Your Hospitalizations Provider Role Specialty Primary office phone Lyle Salcido MD Attending Provider Emergency Medicine [phone] Justice Prasad MD Attending Provider [facility] [phone] Izabela Bowling DO Attending Provider Internal Medicine [phone] Your Primary Care Physician (PCP) Primary Care Physician Office Phone Office Fax [facility] [phone] Follow-up Information None Current Discharge Medication List  
  
ASK your doctor about these medications Dose & Instructions Dispensing Information Comments Morning Noon Evening Bedtime  
 aspirin 81 mg tablet Your last dose was: Your next dose is:    
   
   
 Dose:  81 mg Take 81 mg by mouth. Refills:  0  
     
   
   
   
  
 CRESTOR 5 mg tablet Generic drug:  rosuvastatin Your last dose was: Your next dose is: Take  by mouth nightly. Refills:  0  
     
   
   
   
  
 docusate sodium 100 mg capsule Commonly known as:  Paxton Oneill Your last dose was: Your next dose is:    
   
   
 Dose:  100 mg Take 100 mg by mouth two (2) times a day. Refills:  0 Dutasteride-Tamsulosin 0.5-0.4 mg Cm24 Commonly known as:  Jojo Ryder Your last dose was: Your next dose is:    
   
   
 Dose:  1 Cap Take 1 Cap by mouth daily (after dinner). Quantity:  90 Cap Refills:  4  
     
   
   
   
  
 finasteride 5 mg tablet Commonly known as:  PROSCAR Your last dose was: Your next dose is:    
   
   
 Dose:  5 mg Take 5 mg by mouth daily. Refills:  0  
     
   
   
   
  
 furosemide 40 mg tablet Commonly known as:  LASIX Your last dose was: Your next dose is: Take  by mouth daily. Refills:  0  
     
   
   
   
  
 metoprolol tartrate 25 mg tablet Commonly known as:  LOPRESSOR Your last dose was: Your next dose is: Take  by mouth two (2) times a day. Refills:  0  
     
   
   
   
  
 NAMENDA 10 mg tablet Generic drug:  memantine Your last dose was: Your next dose is: Take  by mouth daily. Refills:  0  
     
   
   
   
  
 potassium chloride 20 mEq packet Commonly known as:  KLOR-CON Your last dose was: Your next dose is:    
   
   
 Dose:  20 mEq Take 20 mEq by mouth two (2) times a day. Refills:  0 Discharge Instructions DISCHARGE SUMMARY from Nurse The following personal items are in your possession at time of discharge: 
 
Dental Appliances: None Visual Aid: None Home Medications: None Jewelry: None Clothing: None Other Valuables: None PATIENT INSTRUCTIONS: 
 
 
F-face looks uneven A-arms unable to move or move unevenly S-speech slurred or non-existent T-time-call 911 as soon as signs and symptoms begin-DO NOT go Back to bed or wait to see if you get better-TIME IS BRAIN. Warning Signs of HEART ATTACK Call 911 if you have these symptoms: 
? Chest discomfort. Most heart attacks involve discomfort in the center of the chest that lasts more than a few minutes, or that goes away and comes back. It can feel like uncomfortable pressure, squeezing, fullness, or pain. ? Discomfort in other areas of the upper body. Symptoms can include pain or discomfort in one or both arms, the back, neck, jaw, or stomach. ? Shortness of breath with or without chest discomfort. ? Other signs may include breaking out in a cold sweat, nausea, or lightheadedness. Don't wait more than five minutes to call [address]! Fast action can save your life. Calling 911 is almost always the fastest way to get lifesaving treatment. Emergency Medical Services staff can begin treatment when they arrive  up to an hour sooner than if someone gets to the hospital by car. The discharge information has been reviewed with the patient. The patient verbalized understanding.  
 
Discharge medications reviewed with the patient and appropriate educational materials and side effects teaching were provided. MyChart Activation Thank you for enrolling in [address] Please follow the instructions below to securely access your online medical record. [facility] allows you to send messages to your doctor, view your test results, renew your prescriptions, schedule appointments, and more. How Do I Sign Up? 1. In your internet browser, go to [email or web address]. 2. Click on the First Time User? Click Here link in the Sign In box. You will see the New Member Sign Up page. 3. Enter your [facility] Access Code exactly as it appears below. You will not need to use this code after youve completed the sign-up process. If you do not sign up before the expiration date, you must request a new code. [facility] Access Code: [email or web address] Expires: 8/29/2017  4:51 PM  
 
4. Enter the last four digits of your Social Security Number (xxxx) and Date of Birth (mm/dd/yyyy) as indicated and click Submit. You will be taken to the next sign-up page. 5. Create a [facility] ID. This will be your [facility] login ID and cannot be changed, so think of one that is secure and easy to remember. 6. Create a [facility] password. You can change your password at any time. 7. Enter your Password Reset Question and Answer. This can be used at a later time if you forget your password. 8. Enter your e-mail address. You will receive e-mail notification when new information is available in [address] 9. Click Sign Up. You can now view your medical record. Additional Information Remember, [facility] is NOT to be used for urgent needs. For medical emergencies, dial 911. Now available from your iPhone and Android! Patient armband removed and shredded Discharge Instructions Attachments/References SEPSIS (ENGLISH) UTI (URINARY TRACT INFECTION): MALE (ENGLISH) CAD (CORONARY ARTERY DISEASE): GENERAL INFO (ENGLISH) Discharge Orders None Introducing [facility]! Ayaz Govea introduces [facility] patient portal. Now you can access parts of your medical record, email your doctor's office, and request medication refills online. 1. In your internet browser, go to [email or web address]/"[email or web address] 2. Click on the First Time User? Click Here link in the Sign In box. You will see the New Member Sign Up page. 3. Enter your [facility] Access Code exactly as it appears below. You will not need to use this code after youve completed the sign-up process. If you do not sign up before the expiration date, you must request a new code. · [facility] Access Code: [email or web address] Expires: 8/29/2017  4:51 PM 
 
4. Enter the last four digits of your Social Security Number (xxxx) and Date of Birth (mm/dd/yyyy) as indicated and click Submit. You will be taken to the next sign-up page. 5. Create a [facility] ID. This will be your [facility] login ID and cannot be changed, so think of one that is secure and easy to remember. 6. Create a [facility] password. You can change your password at any time. 7. Enter your Password Reset Question and Answer. This can be used at a later time if you forget your password. 8. Enter your e-mail address. You will receive e-mail notification when new information is available in [address] 9. Click Sign Up. You can now view and download portions of your medical record. 10. Click the Download Summary menu link to download a portable copy of your medical information. If you have questions, please visit the Frequently Asked Questions section of the [facility] website. Remember, [facility] is NOT to be used for urgent needs. For medical emergencies, dial 911. Now available from your iPhone and Android! General Information Please provide this summary of care documentation to your next provider. Patient Signature:  ____________________________________________________________ Date:  ____________________________________________________________  
  
Jessica Joy Provider Signature:  ____________________________________________________________ Date:  ____________________________________________________________ More Information Sepsis: Care Instructions Your Care Instructions Sepsis is an infection that has spread throughout your body. It is a life-threatening condition and often causes extremely low blood pressure. This can lead to problems with many different organs. The cause of sepsis is not always clear, but it can happen as part of a long-term or sudden illness. Sometimes even a mild illness can lead to sepsis. Follow-up care is a key part of your treatment and safety. Be sure to make and go to all appointments, and call your doctor if you are having problems. Its also a good idea to know your test results and keep a list of the medicines you take. How can you care for yourself at home? · If your doctor prescribed antibiotics, take them as directed. Do not stop taking them just because you feel better. You need to take the full course of antibiotics. · Drink plenty of fluids, enough so that your urine is light yellow or clear like water. Choose water or caffeine-free clear liquids until you feel better. If you have kidney, heart, or liver disease and have to limit fluids, talk with your doctor before you increase your fluid intake. You can try rehydration drinks, such as Gatorade or Powerade. · Do not drink alcohol. · Eat a healthy diet. Include fruits, vegetables, and whole grains in your diet every day. · Walking is an easy way to get exercise. Gradually increase the amount you walk every day. Make sure your doctor knows that you are starting an exercise program. 
· Do not smoke or use other tobacco products. If you need help quitting, talk to your doctor about stop-smoking programs and medicines. These can increase your chances of quitting for good. When should you call for help? Call 911 anytime you think you may need emergency care. For example, call if: 
· You passed out (lost consciousness). Call your doctor now or seek immediate medical care if: 
· You have a fever or chills. · You have cool, pale, or clammy skin. · You are dizzy or lightheaded, or you feel like you may faint. · You have any new symptoms, such as a cough, pain in one part of your body, or urinary problems. Watch closely for changes in your health, and be sure to contact your doctor if: 
· You do not get better as expected. Where can you learn more? Go to http://mario-stephanie.info/. Enter X061 in the search box to learn more about \"Sepsis: Care Instructions. \" Current as of: May 27, 2016 Content Version: 11.2 © 8878-9486 [employer] Systems. Care instructions adapted under license by [employer] (which disclaims liability or warranty for this information). If you have questions about a medical condition or this instruction, always ask your healthcare professional. [place] [address] any warranty or liability for your use of this information. Urinary Tract Infections in Men: Care Instructions Your Care Instructions A urinary tract infection, or UTI, is a general term for an infection anywhere between the kidneys and the tip of the penis. UTIs can also be a result of a prostate problem. Most cause pain or burning when you urinate. Most UTIs are caused by bacteria and can be cured with antibiotics. It is important to complete your treatment so that the infection does not get worse. Follow-up care is a key part of your treatment and safety. Be sure to make and go to all appointments, and call your doctor if you are having problems. It's also a good idea to know your test results and keep a list of the medicines you take. How can you care for yourself at home? · Take your antibiotics as prescribed. Do not stop taking them just because you feel better. You need to take the full course of antibiotics. · Take your medicines exactly as prescribed. Your doctor may have prescribed a medicine, such as phenazopyridine (Pyridium), to help relieve pain when you urinate. This turns your urine orange. You may stop taking it when your symptoms get better. But be sure to take all of your antibiotics, which treat the infection. · Drink extra water for the next day or two. This will help make the urine less concentrated and help wash out the bacteria causing the infection. (If you have kidney, heart, or liver disease and have to limit your fluids, talk with your doctor before you increase your fluid intake.) · Avoid drinks that are carbonated or have caffeine. They can irritate the bladder. · Urinate often. Try to empty your bladder each time. · To relieve pain, take a hot bath or lay a heating pad (set on low) over your lower belly or genital area. Never go to sleep with a heating pad in place. To help prevent UTIs · Drink plenty of fluids, enough so that your urine is light yellow or clear like water. If you have kidney, heart, or liver disease and have to limit fluids, talk with your doctor before you increase the amount of fluids you drink. · Urinate when you have the urge. Do not hold your urine for a long time. Urinate before you go to sleep. · Keep your penis clean. Catheter care If you have a drainage tube (catheter) in place, the following steps will help you care for it. · Always wash your hands before and after touching your catheter. · Check the area around the urethra for inflammation or signs of infection. Signs of infection include irritated, swollen, red, or tender skin, or pus around the catheter. · Clean the area around the catheter with soap and water two times a day. Dry with a clean towel afterward. · Do not apply powder or lotion to the skin around the catheter. To empty the urine collection bag · Wash your hands with soap and water. · Without touching the drain spout, remove the spout from its sleeve at the bottom of the collection bag. Open the valve on the spout. · Let the urine flow out of the bag and into the toilet or a container. Do not let the tubing or drain spout touch anything. · After you empty the bag, clean the end of the drain spout with tissue and water. Close the valve and put the drain spout back into its sleeve at the bottom of the collection bag. · Wash your hands with soap and water. When should you call for help? Call your doctor now or seek immediate medical care if: · Symptoms such as a fever, chills, nausea, or vomiting get worse or happen for the first time. · You have new pain in your back just below your rib cage. This is called flank pain. · There is new blood or pus in your urine. · You are not able to take or keep down your antibiotics. Watch closely for changes in your health, and be sure to contact your doctor if: 
· You are not getting better after taking an antibiotic for 2 days. · Your symptoms go away but then come back. Where can you learn more? Go to http://mario-stephanie.info/. Enter A942 in the search box to learn more about \"Urinary Tract Infections in Men: Care Instructions. \" Current as of: November 28, 2016 Content Version: 11.2 © 5426-0271 MyNewPlace. Care instructions adapted under license by [employer] (which disclaims liability or warranty for this information). If you have questions about a medical condition or this instruction, always ask your healthcare professional. [place] [address] any warranty or liability for your use of this information. Learning About Coronary Artery Disease (CAD) What is coronary artery disease? Coronary artery disease (CAD) occurs when plaque builds up in the arteries that bring oxygen-rich blood to your heart. Plaque is a fatty substance made of cholesterol, calcium, and other substances in the blood. This process is called hardening of the arteries, or atherosclerosis. What happens when you have coronary artery disease? · Plaque may narrow the coronary arteries. Narrowed arteries cause poor blood flow. This can lead to angina symptoms such as chest pain or discomfort. If blood flow is completely blocked, you could have a heart attack. · You can slow CAD and reduce the risk of future problems by making changes in your lifestyle. These include quitting smoking and eating heart-healthy foods. · Treatments for CAD, along with changes in your lifestyle, can help you live a longer and healthier life. How can you prevent coronary artery disease? · Do not smoke. It may be the best thing you can do to prevent heart disease. If you need help quitting, talk to your doctor about stop-smoking programs and medicines. These can increase your chances of quitting for good. · Be active. Get at least 30 minutes of exercise on most days of the week. Walking is a good choice. You also may want to do other activities, such as running, swimming, cycling, or playing tennis or team sports. · Eat heart-healthy foods. Eat more fruits and vegetables and less foods that contain saturated and trans fats. Limit alcohol, sodium, and sweets. · Stay at a healthy weight. Lose weight if you need to. · Manage other health problems such as diabetes, high blood pressure, and high cholesterol. · Manage stress. Stress can hurt your heart. To keep stress low, talk about your problems and feelings. Don't keep your feelings hidden. · If you have talked about it with your doctor, take a low-dose aspirin every day.  Aspirin can help certain people lower their risk of a heart attack or stroke. But taking aspirin isn't right for everyone, because it can cause serious bleeding. Do not start taking daily aspirin unless your doctor knows about it. How is coronary artery disease treated? · Your doctor will suggest that you make lifestyle changes. For example, your doctor may ask you to eat healthy foods, quit smoking, lose extra weight, and be more active. · You will have to take medicines. · Your doctor may suggest a procedure to open narrowed or blocked arteries. This is called angioplasty. Or your doctor may suggest using healthy blood vessels to create detours around narrowed or blocked arteries. This is called bypass surgery. Follow-up care is a key part of your treatment and safety. Be sure to make and go to all appointments, and call your doctor if you are having problems. It's also a good idea to know your test results and keep a list of the medicines you take. Where can you learn more? Go to http://mario-stephanie.info/. Enter ([phone] in the search box to learn more about \"Learning About Coronary Artery Disease (CAD). \" Current as of: January 27, 2016 Content Version: 11.2 © 2697-7794 YelloYello. Care instructions adapted under license by [employer] (which disclaims liability or warranty for this information). If you have questions about a medical condition or this instruction, always ask your healthcare professional. [address] any warranty or liability for your use of this information.

## 2017-06-13 PROBLEM — G30.9 ALZHEIMER'S DEMENTIA WITHOUT BEHAVIORAL DISTURBANCE (HCC): Status: ACTIVE | Noted: 2017-01-01

## 2017-06-13 PROBLEM — R78.81 BACTEREMIA: Status: ACTIVE | Noted: 2017-01-01

## 2017-06-13 PROBLEM — N17.9 ACUTE KIDNEY FAILURE (HCC): Status: ACTIVE | Noted: 2017-01-01

## 2017-06-13 PROBLEM — E87.6 HYPOKALEMIA: Status: ACTIVE | Noted: 2017-01-01

## 2017-06-13 PROBLEM — N40.0 BPH (BENIGN PROSTATIC HYPERTROPHY): Status: ACTIVE | Noted: 2017-01-01

## 2017-06-13 PROBLEM — I25.10 CAD (CORONARY ARTERY DISEASE): Status: ACTIVE | Noted: 2017-01-01

## 2017-06-13 PROBLEM — E66.9 OBESITY (BMI 30.0-34.9): Status: ACTIVE | Noted: 2017-01-01

## 2017-06-13 PROBLEM — I48.91 ATRIAL FIBRILLATION WITH RAPID VENTRICULAR RESPONSE (HCC): Status: ACTIVE | Noted: 2017-01-01

## 2017-06-13 PROBLEM — E78.5 HYPERLIPIDEMIA: Status: ACTIVE | Noted: 2017-01-01

## 2017-06-13 PROBLEM — F02.80 ALZHEIMER'S DEMENTIA WITHOUT BEHAVIORAL DISTURBANCE (HCC): Status: ACTIVE | Noted: 2017-01-01

## 2017-06-13 PROBLEM — E87.0 HYPERNATREMIA: Status: ACTIVE | Noted: 2017-01-01

## 2017-06-13 NOTE — DIABETES MGMT
NUTRITIONAL ASSESSMENT GLYCEMIC CONTROL/ PLAN OF CARE     Michelle Ledbetter           67 y.o.           6/12/2017                 1. Sepsis, due to unspecified organism ([address]    2. Pyelonephritis    3. Fever, unspecified fever cause    4. Tachycardia    5. Atrial fibrillation, unspecified type [facility])         INTERVENTIONS/PLAN:   Will monitor feeding status, glycemic control and potential need for insulin coverage, labs and weights. ASSESSMENT:   Nutritional Status: Pt is 145% ideal wt; BMI (calculated): 34.6 kg/m2 (obese). Appears well nourished. Cannot locate diet order status from nursing home records. Diabetes Management:   No history of diabetes seen  Recent blood glucose:    6/13:  Lab - 191  6/12:  Lalb - 256     Within target range (non-ICU: <140; ICU<180): [] Yes   [x]  No  Current Insulin regimen: none   Home medication/insulin regimen: none  HbA1c:none  Adequate glycemic control PTA:  [] Yes  [] No n/a       SUBJECTIVE/OBJECTIVE:   Information obtained from: chart review, ICU rounds  Pt sleeping soundly at rounds. Pt admitted from nursing home with fever, AMS, UTI/sepsis and hx of HTN, Alzheimer's, CAD    Diet: NPO    No data found.   Medications: [x]                Reviewed   IVF:  NS with 40 mEq KCl/L at 125 ml/hr    Most Recent POC Glucose:   Recent Labs      06/13/17   0345  06/12/17   1530   GLU  191*  256*         Labs:   No results found for: HBA1C, HGBE8, [id number]  Lab Results   Component Value Date/Time    Sodium 148 06/13/2017 03:45 AM    Potassium 3.1 06/13/2017 03:45 AM    Chloride 115 06/13/2017 03:45 AM    CO2 23 06/13/2017 03:45 AM    Anion gap 10 06/13/2017 03:45 AM    Glucose 191 06/13/2017 03:45 AM    BUN 35 06/13/2017 03:45 AM    Creatinine 1.40 06/13/2017 03:45 AM    Calcium 7.8 06/13/2017 03:45 AM    Magnesium 2.2 06/12/2017 03:30 PM    Phosphorus 2.8 06/13/2017 10:55 AM    Albumin 2.9 06/12/2017 03:30 PM       Anthropometrics: IBW : 86.2 kg (190 lb), % IBW (Calculated): 141.79 %, BMI (calculated): 34.6  Wt Readings from Last 1 Encounters:   06/13/17 122.2 kg (269 lb 6.4 oz)      Ht Readings from Last 1 Encounters:   06/13/17 6' 2\" (1.88 m)       Estimated Nutrition Needs:  2420 Kcals/day, Protein (g): 103 g Fluid (ml): 2400 ml  Based on:   [x]          Actual BW    []          ABW   []            Adjusted BW        Nutrition Diagnoses:   Inadequate oral food and beverage intake due to AMS as evidenced by NPO orders. Obesity due to excess energy intake AEB BMI of 34.6 kg         Nutrition Interventions:  None at this time  Goal:   Blood glucose will be within target range of  mg/dL by 6/16/17. Provision of adequate nutrition with pt receiving >75% estimated needs by 6/18/17. Weight maintenance (+/- 1-2 kg) by 6/23/17.         Nutrition Monitoring and Evaluation      []     Monitor po intake on meal rounds  [x]     Continue inpatient monitoring and intervention  []     Other:      Nutrition Risk:  [x]   High     []  Moderate    []  Minimal/Uncompromised    Kelsie Millan RD, CDE   Office:  [address] [place] [address] Pager:  [phone]

## 2017-06-13 NOTE — CDMP QUERY
Please clarify if this patient is being treated/managed for:    =>Encephalopathy (please specify if metabolic, septic, etc) in the setting of altered mental status and sepsis with UTI.  =>Other Explanation of clinical findings  =>Unable to Determine (no explanation of clinical findings)    The medical record reflects the following:    Risk: Age, UTI, Sepsis    Clinical Indicators: Adm with sepsis and UTI. [facility] [place] staff noted patient \"was not himself socially. Dora Sena According to the staff the patient is usually very interactive; however, today he was not. \" Patient unresponsive in ED. (Noted to have Alzheimer's dementia.)     Treatment: Sepsis protocol. Please clarify and document your clinical opinion in the progress notes and discharge summary including the definitive and/or presumptive diagnosis, (suspected or probable), related to the above clinical findings. Please include clinical findings supporting your diagnosis. If you DECLINE this query or would like to communicate with [facility], please utilize the \"[facility] message box\" at the TOP of the Progress Note on the right.       Thank you,    Cherelle Pedroza RN [address] [facility]

## 2017-06-13 NOTE — CDMP QUERY
Please clarify if this patient is being treated/managed for:    =>Severe sepsis in the setting of sepsis with platelets of 62 and lactic acid on adm of 4.9.  =>Other Explanation of clinical findings  =>Unable to Determine (no explanation of clinical findings)    The medical record reflects the following:    Risk: age, unresponsive on adm. Clinical Indicators: Temp 105 on adm with heart rate 174, resp 33, positive UA and urine cx: >100,000 COLONIES/mL GRAM NEGATIVE RODS. Lactic acid on adm 4.9, platelets 62. Treatment: Sepsis protocol incl. NS 3540 ml, vanco, zosyn and levaquin in ED. Please clarify and document your clinical opinion in the progress notes and discharge summary including the definitive and/or presumptive diagnosis, (suspected or probable), related to the above clinical findings. Please include clinical findings supporting your diagnosis. If you DECLINE this query or would like to communicate with [facility], please utilize the \"[employer] message box\" at the TOP of the Progress Note on the right.       Thank you,    Niru Woodward RN [id number] [facility]

## 2017-06-13 NOTE — PROGRESS NOTES
Evaluation orders received and chart reviewed patient's heart rate increased and not in control as well as in restraints will check back later today     1645 attempted to see for second attempt  Patient still having high heart rate and  Nursing requested to hold at this time. Will see tomorrow.

## 2017-06-13 NOTE — PROGRESS NOTES
Internal Medicine Progress Note    Patient's Name: Frankey Frame  Admit Date: 6/12/2017  Length of Stay: 0      Assessment/Plan     Active Hospital Problems    Diagnosis Date Noted    Bacteremia 06/13/2017    Essential hypertension 06/13/2017    BPH (benign prostatic hypertrophy) 06/13/2017    Hyperlipidemia 06/13/2017    Alzheimer's dementia without behavioral disturbance 06/13/2017    CAD (coronary artery disease) 06/13/2017    Atrial fibrillation with rapid ventricular response ([address] 06/13/2017    Hypokalemia 06/13/2017    Acute kidney failure ([address] 06/13/2017    UTI (urinary tract infection) 06/12/2017    Sepsis ([facility] 06/12/2017   Severe sepsis in the setting of sepsis with platelets of 62 and lactic acid on adm of 4.9. Acute metabolic encephalopathy 2/2 Sepsis    - Cont extended GNR coverage until culture back  - Repeat blood cultures  - Cont IVFs  - Cont BB push for now, will change to dilt gtt if remains uncontrolled  - Hold off on full anticoagulation at this point as possible convert with tx of sepsis  - CHADSVASC - 4  - Replete electrolytes  - Cont acceptable home medications for chronic conditions   - DVT protocol    I have personally reviewed all pertinent labs and films that have officially resulted over the last 24 hours. I have personally checked for all pending labs that are awaiting final results.     Subjective     Pt s/e @ bedside  In stepdown  Denies CP or SOB  Denies abd pain    Objective     Visit Vitals    BP (!) 152/92    Pulse (!) 120    Temp 99.4 °F (37.4 °C)    Resp 21    Ht 6' 2\" (1.88 m)    Wt 122.2 kg (269 lb 6.4 oz)    SpO2 96%    BMI 34.59 kg/m2       Physical Exam:  General Appearance: NAD, conversant  HENT: normocephalic/atraumatic, dry mucus membranes  Neck: No JVD, supple  Lungs: CTA with normal respiratory effort  CV: IRIR, no m/r/g  Abdomen: soft, non-tender, obese, normal bowel sounds  Extremities: no cyanosis, moves ext  Neuro: No focal deficits, motor/sensory intact  Skin: Normal color, intact  Lymphatics: No cervical or supraclavicular lymphadenopathy  Psych: appropriate affect, alert and oriented to person, place    Intake and Output:  Current Shift:  06/13 0701 - 06/13 1900  In: 1039.2 [I.V.:1039.2]  Out: 1915 [Urine:1915]  Last three shifts:  06/11 1901 - 06/13 0700  In: 1148.8 [I.V.:1148.8]  Out: 1050 [Urine:1050]    Lab/Data Reviewed:  CMP:   Lab Results   Component Value Date/Time     (H) 06/13/2017 03:45 AM    K 3.1 (L) 06/13/2017 03:45 AM     (H) 06/13/2017 03:45 AM    CO2 23 06/13/2017 03:45 AM    AGAP 10 06/13/2017 03:45 AM     (H) 06/13/2017 03:45 AM    BUN 35 (H) 06/13/2017 03:45 AM    CREA 1.40 (H) 06/13/2017 03:45 AM    GFRAA 60 (L) 06/13/2017 03:45 AM    GFRNA 49 (L) 06/13/2017 03:45 AM    CA 7.8 (L) 06/13/2017 03:45 AM    PHOS 2.8 06/13/2017 10:55 AM     CBC:   Lab Results   Component Value Date/Time    WBC 5.8 06/13/2017 03:45 AM    HGB 10.9 (L) 06/13/2017 03:45 AM    HCT 34.1 (L) 06/13/2017 03:45 AM    PLT 53 (L) 06/13/2017 03:45 AM     All Cardiac Markers in the last 24 hours: No results found for: CPK, CKMMB, CKMB, RCK3, CKMBT, CKNDX, CKND1, PALOMO, TROPT, TROIQ, JUDI, TROPT, TNIPOC, BNP, BNPP    Imaging Reviewed:  Ct Abd Pelv Wo Cont    Result Date: 6/12/2017  EXAM: CT of the abdomen and pelvis INDICATION: Altered mental status COMPARISON: Without TECHNIQUE: Axial CT imaging of the abdomen and pelvis was performed without intravenous contrast. Multiplanar reformats were generated. DOSE REDUCTION:  One or more dose reduction techniques were used on this CT: automated exposure control, adjustment of the mAs and/or kVp according to patient's size, and iterative reconstruction techniques. The specific techniques utilized on this CT exam have been documented in the patient's electronic medical record. _______________ FINDINGS: LOWER CHEST: There is bibasilar atelectasis. There is a moderate size hiatal hernia.  Along the posterior right aspect of this hiatal hernia there is a soft tissue density in the herniated omental fat measuring 3.9 x 2 cm. This is unclear etiology. LIVER, BILIARY: Liver is normal. No biliary dilation. Tiny gallstone is seen in the neck of the gallbladder measuring 6 mm. PANCREAS: Normal. SPLEEN: Spleen is enlarged measuring 15.7 cm. ADRENALS: Unremarkable KIDNEYS/URETERS: Right kidney is unremarkable. There is perinephric stranding around the left kidney. There is a nonobstructive calculus in the midpole of the left kidney measuring 5 mm. There is mild hydronephrosis and hydroureter down to the left distal ureter where there is an 5 mm calcification image 123. This may represent a phlebolith or a distal ureteral stone. Arvell Ben VASCULATURE: Moderate atherosclerosis present. LYMPH NODES: There are no pathologic sized lymph nodes. GASTRO INTESTINAL TRACT: There is a moderate size hiatal hernia. There is no bowel obstruction. Colonic diverticulosis present. There is no evidence of acute diverticulitis. Appendix is normal. PELVIC ORGANS/BLADDER: There is a Archuleta catheter within the bladder. Prostate gland is mildly enlarged. BONES: No acute or aggressive osseous abnormalities identified. There is diffuse osteopenia. There is a probable Tarlov cyst in the sacrum measuring approximately 5 x 2.5 cm. OTHER: None. _______________     IMPRESSION: 1. Mild hydronephrosis and hydroureter on the left. Along the course of left ureter there is a 5 mm calcific density which may represent a distal ureteral obstructing calculus or a phlebolith. No other bladder calculi seen. Enlarged prostate with Archuleta in place Moderate size hiatal hernia. Soft tissue density is noted along the right posterior aspect of this hiatal hernia of unclear etiology. This may represent fluid or other soft tissue density. Enlarged spleen Gallstone in the neck of the gallbladder. No CT evidence for acute cholecystitis.  Colonic diverticulosis without diverticulitis. .       Medications Reviewed:  Current Facility-Administered Medications   Medication Dose Route Frequency    aspirin delayed-release tablet 81 mg  81 mg Oral DAILY    docusate sodium (COLACE) capsule 100 mg  100 mg Oral BID    furosemide (LASIX) tablet 40 mg  40 mg Oral DAILY    metoprolol tartrate (LOPRESSOR) tablet 25 mg  25 mg Oral BID    rosuvastatin (CRESTOR) tablet 5 mg  5 mg Oral QHS    dilTIAZem (CARDIZEM) injection 10 mg  10 mg IntraVENous ONCE    piperacillin-tazobactam (ZOSYN) 4.5 g in 0.9% sodium chloride (MBP/ADV) 100 mL MBP- EXTENDED 4 HOUR INFUSION###  4.5 g IntraVENous Q8H    metoprolol (LOPRESSOR) 5 mg/5 mL injection        dutasteride (AVODART) capsule 0.5 mg  0.5 mg Oral DAILY    And    tamsulosin (FLOMAX) capsule 0.4 mg  0.4 mg Oral DAILY    0.9% sodium chloride with KCl 40 mEq/L infusion   IntraVENous CONTINUOUS    sodium chloride (NS) flush 5-10 mL  5-10 mL IntraVENous PRN    levoFLOXacin (LEVAQUIN) 750 mg in D5W IVPB  750 mg IntraVENous Q24H    enoxaparin (LOVENOX) injection 40 mg  40 mg SubCUTAneous Q24H           Horacio Fonseca DO  Internal Medicine, Hospitalist  Pager: [phone]  [address] [place] [address]

## 2017-06-13 NOTE — ACP (ADVANCE CARE PLANNING)
Patient has designated ______wife__________________ to participate in his/her discharge plan and to receive any needed information.      Name: Leah Zhao  Address:  Phone [phone]

## 2017-06-13 NOTE — PROGRESS NOTES
Physical Exam   Skin:         0105- received pt from ER, moved to ICU bed, given bath and connected to monitor. 0130- assessment done. Denies pain. 0400- reassessment done. Pt trying to pull hager. Bilateral wrist restraints in use.  0600- spoke to Dr. Joo Eugene about pt HR. Orders received for EKG and cardizem bolus if pt was in afib with RVR.  3228- EKG done and Dr. Joo Eugene paged. 7077- Dr. Joo Eugene returned page and after being given the EKG report, ordered to hold cardizem for now. 3191- Bedside and Verbal shift change report given to Armando (oncoming nurse) by Donna Vera, RN   (offgoing nurse). Report included the following information SBAR, Kardex, Intake/Output, MAR and Recent Results.

## 2017-06-13 NOTE — PROGRESS NOTES
[facility]   Discharge Planning/ Assessment    Reasons for Intervention: Chart reviewed and discussed in rounds. Pt lives at the [facility]. I tried calling His NOK wife Sonia Desai [phone]. She was not answering and her phone did not take messages. Pt was confused with some information so I needed to confirm it. Talked with RCD  Brittany at [facility] [phone]. She States pt lives in [address] [facility] at the [facility]. That when his wife is with him, he is more grounded and oriented. At the [facility], he ambulates with a walker, feeds himself, refuses a lot of care, and is incontenent of urine. She says he urinates everywhere, in toilet, corners, air conditioner, elevators. She says his wife is very involved with his care. Will follow.     High Risk Criteria  [x] Yes  []No   Physician Referral  [] Yes  [x]No        Date    Nursing Referral  [] Yes  [x]No        Date    Patient/Family Request  [] Yes  [x]No        Date       Resources:    Medicare  [x] Yes  []No   Medicaid  [] Yes  [x]No   No Resources  [] Yes  [x]No   Private Insurance  [x] Yes  []No    Name/Phone Number    Other  [] Yes  [x]No        (i.e. Workman's Comp)         Prior Services:    Prior Services  [] Yes  [x]No   Home Health  [] Yes  [x]No   [facility]  [] Yes  [x]No        Number of [address]  [] Yes  [x]No       Meals on Wheels  [] Yes  [x]No   Office on Aging  [] Yes  [x]No   Transportation Services  [] Yes  [x]No   Nursing Home  [] Yes  [x]No        Nursing Home Name    [address]  [] Yes  [x]No        [address] Name    Other       Information Source:      Information obtained from  [] Patient  [] Parent   [] Katy Drafts  [] Child  [] Spouse   [] Significant Other/Partner   [] Friend      [] EMS    [] Nursing Home Chart          [] Other:   Chart Review  [x] Yes  []No     Family/Support System:    Patient lives with  [] Alone    [] Spouse   [] Significant Other  [] Children  [] Caretaker   [] Parent  [] Sibling     [x] Other       Other Support System:    Is the patient responsible for care of others  [] Yes  [x]No   Information of person caring for patient on  discharge    Managers financial affairs independently  [] Yes  [x]No   If no, explain:      Status Prior to Admission:    Mental Status  [] Awake  [] Alert  [] Oriented  [] Quiet/Calm [] Lethargic/Sedated   [x] Disoriented  [] Restless/Anxious  [] Combative   Personal Care  [x] Dependent  [] [address]  [] Requires Assistance   Meal Preparation Ability  [] Independent   [] Standby Assistance   [] Minimal Assistance   [] Moderate Assistance  [] Maximum Assistance     [x] Total Assistance   Chores  [] Independent with Chores   [] N/A Nursing Home Resident   [x] Requires Assistance   Bowel/Bladder  [x] Continent  [] Catheter  [] Incontinent  [] Ostomy Self-Care    [] Urine Diversion Self-Care  [] Maximum Assistance     [] Total Assistance   Number of Persons needed for assistance    DME at home  [] Rachel Quan Scripture  [] Celestine Quan   [] Commode    [] Bathroom/Grab Bars  [] Hospital Bed  [] Nebulizer  [] Oxygen           [] Raised Toilet Seat  [] Shower Chair  [] Side Rails for Bed   [] Tub Transfer Bench   [] Sury Anglin  [] Michael Martinez, Standard      [] Other:   Vendor      Treatment Presently Receiving:    Current Treatments  [] Chemotherapy  [] Dialysis  [] Insulin  [] IVAB [x] IVF   [] O2  [] PCA   [] PT   [] RT   [] Tube Feedings   [] Wound Care     Psychosocial Evaluation:    Verbalized Knowledge of Disease Process  [] Patient  []Family   Coping with Disease Process  [] Patient  []Family   Requires Further Counseling Coping with Disease Process  [] Patient  []Family     Identified Projected Needs:    Home Health Aid  [] Yes  [x]No   Transportation  [] Yes  [x]No   Education  [] Yes  [x]No        Specific Education     Financial Counseling  [] Yes  [x]No   Inability to Care for Self/Will Require 24 hour care  [] Yes  [x]No   Pain Management  [] Yes  [x]No   Home Infusion Therapy  [] Yes  [x]No   Oxygen Therapy  [] Yes  [x]No   DME  [] Yes  [x]No   Long Term Care Placement  [] Yes  [x]No   Rehab  [] Yes  [x]No   Physical Therapy  [] Yes  [x]No   Needs Anticipated At This Time  [] Yes  [x]No     Intra-Hospital Referral:    [facility]  [] Yes  [x]No     [] Yes  [x]No   Patient Representative  [] Yes  [x]No   Staff for Teaching Needs  [] Yes  [x]No   Specialty Teaching Needs     Diabetic Educator  [] Yes  [x]No   Referral for Diabetic Educator Needed  [] Yes  [x]No  If Yes, place order for Nutritionist or Diabetic Consult     Tentative Discharge Plan:    Home with No Services  [x] Yes  []No   Home with Home Health Follow-up  [] Yes  [x]No        If Yes, specify type    Home Care Program  [] Yes  [x]No        If Yes, specify type    Meals on Wheels  [] Yes  [x]No   Office of Aging  [] Yes  [x]No   NHP  [] Yes  [x]No   Return to the Nursing Home  [] Yes  [x]No   Rehab Therapy  [] Yes  [x]No   Acute Rehab  [] Yes  [x]No   Subacute Rehab  [] Yes  [x]No   Private Care  [] Yes  [x]No   Substance Abuse Referral  [] Yes  [x]No   Transportation  [] Yes  [x]No   Chore Service  [] Yes  [x]No   Inpatient Hospice  [] Yes  [x]No   OP RT  [] Yes  [x] No   OP Hemo  [] Yes  [x] No   OP PT  [] Yes  [x]No   Support Group  [] Yes  [x]No   Reach to Recovery  [] Yes  [x]No   OP Oncology Clinic  [] Yes  [x]No   Clinic Appointment  [] Yes  [x]No   DME  [] Yes  [x]No   Comments    Name of D/C Planner or  Given to Patient or Family Arash Kam Sahu Person   Phone Number         Extension [phone]   Date 06/13/17   Time    If you are discharged home, whom do you designate to participate in your discharge plan and receive any information needed?      Enter name of designee Did not say        Phone # of designee         Address of designee         Updated         Patient refused to designate any           individual

## 2017-06-13 NOTE — INTERDISCIPLINARY ROUNDS
ICU AM  (Multidisciplinary/ Interdisciplinary) Rounds   Observed patient & discussed case w/ BSRN. Sepsis UTI  Resident of NH  A&A taking po applesauce  * NOTE: we have changed Fariba home med to give individual components (dutasteride & tamulosin) while hospitalized  Management by Hospitalist in Step-down status  PCCM available as needed.    Please call for consult, questions or concerns.  -ger   pager: [phone]

## 2017-06-13 NOTE — H&P
[address]    Name:  Chely Aguilar  MR#:  [id number]  :  1940  Account #:  [de-identified]  Date of Adm:  2017      CHIEF COMPLAINT: Altered mental status and fever. HISTORY OF PRESENT ILLNESS: The patient is a 58-year-old male  resident of a nursing home. The staff called EMS today when the  patient was not himself socially and he was running a high fever. The  patient has a history of hypertension, Alzheimer's, CAD. According to  the staff, the patient is usually very interactive; however, today he was  not. They grew concerned and decided to send him for evaluation. While in the ER, it is reported that the patient was unresponsive, came  in with a temperature of 105, tachycardic and hypertensive. The patient  was found to have a UTI and elevated lactic acid levels. The patient  was started on IV antibiotics, given IV fluid boluses and over time he  did show signs of improvement. The patient is now more interactive  than he was initially. He, however, will be admitted for sepsis, UTI and  further treatment of his symptoms.       Past Medical History:   Diagnosis Date    Alzheimer's dementia without behavioral disturbance     BPH (benign prostatic hypertrophy)     CAD (coronary artery disease)     Delirium superimposed on dementia     Elevated PSA     Esophageal reflux     Essential hypertension     Heart disease     Hematuria     Hyperlipidemia     Kidney stone     Lower urinary tract symptoms (LUTS)     Neurogenic bladder     Organic brain syndrome     mild    Prostate disease     Thrombocytopenia ([address]     Vision decreased        Past Surgical History:   Procedure Laterality Date    CARDIAC SURG PROCEDURE UNLIST      HX COLONOSCOPY      x 3.      HX HEART CATHETERIZATION  2013    HX HERNIA REPAIR   and     HX TONSILLECTOMY         Social History     Social History    Marital status:      Spouse name: N/A   Livan Stoddard Number of children: N/A    Years of education: N/A     Occupational History    Not on file. Social History Main Topics    Smoking status: Never Smoker    Smokeless tobacco: Never Used    Alcohol use No    Drug use: No    Sexual activity: Not on file     Other Topics Concern    Not on file     Social History Narrative       No family history on file. No Known Allergies    Review of Systems:  Positive in bold. Unable to obtain. Physical Exam:      Visit Vitals    BP 95/61    Pulse (!) 112    Temp (!) 37.7 °F (3.2 °C)    Resp 16    Ht 6' 2\" (1.88 m)    Wt 118 kg (260 lb 2.3 oz)    SpO2 100%    BMI 33.4 kg/m2       Physical Exam:  Gen:  No distress  HEENT:  Normal cephalic atraumatic. Neck:  Supple, No JVD  Lungs:  Clear bilaterally, no wheeze, no rales, normal effort  Cardiovascular:  Regular Rate and Rhythm, normal S1 and S2, no audible murmur. Capillary refill: < 3 seconds. Abdomen:  Soft, non tender, non distended, normal bowel sounds, no guarding. Extremities:  Well perfused, no cyanosis, no edema   Peripheral pulse:2+  Neurological:  Awake and alert, CN's are intact, normal strength throughout extremities  Skin:  No rashes or moles. Turgor and color normal  Mental Status:  Normal thought process, does not appear anxious      Laboratory Studies: All lab results for the last 24 hours reviewed. Assessment/Plan     Active Problems:    UTI (urinary tract infection) (6/12/2017)      Sepsis ([facility] [address] (6/12/2017)        PLAN:    Infectious: UTI   Continue IV antibiotics   Repeat lactic acid levels every 4 hours until normal    CV: HTN. .  Blood pressure controlled.     Continue antihypertensive mediation as per home   Monitor vitals as per unit   EKG in am     Heme:  DVT prophylaxis   Lovenox 40 mg SQ daily   Bilateral lower extremity compression devices    Metabolic/Endo:    FS AC&HS with sliding scale coverage   Diabetic diet    Misc:  FULL CODE   Physical therapy to evaluate and treat   Fall precautions   Ambulate with assistance. Monitor intake and output   Monitor vitals as per unit   Neurovascular checks   Replace electrolytes as needed. Follow up am labs.          MD LOLI Uriostegui / DOTTY  D:  06/12/2017   20:40  T:  06/12/2017   22:08  Job #:  [id number]

## 2017-06-13 NOTE — ROUTINE PROCESS
0700 bedside verbal report received from Heron Buckner 86. Assumed care of patient 0700       0815 MD Guido Phalen paged to discuss heart rate 100<, blood pressure  0825 order received for metoprolol iv push given as documented on mar, states will round  1630 MD Guido Phalen paged, order received for metoprolol x 2 doses, 5mg iv push - given as documented  1830 order received to transfer to tele, initiate cardizem gtt on 3S. Orders entered as given over phone. TRANSFER - OUT REPORT:    Verbal report given to Zandra Dinh (name) on Ede [place]  being transferred to 3S (unit) for routine progression of care       Report consisted of patients Situation, Background, Assessment and   Recommendations(SBAR). Information from the following report(s) SBAR, Kardex, MAR and Cardiac Rhythm afib - sinus tach was reviewed with the receiving nurse. Lines:   Peripheral IV 06/12/17 Left Antecubital (Active)   Site Assessment Clean, dry, & intact 6/13/2017  4:00 PM   Phlebitis Assessment 0 6/13/2017  4:00 PM   Infiltration Assessment 0 6/13/2017  4:00 PM   Dressing Status Clean, dry, & intact 6/13/2017  4:00 PM   Dressing Type Transparent 6/13/2017  8:00 AM   Hub Color/Line Status Pink;Flushed 6/13/2017  8:00 AM   Action Taken Open ports on tubing capped 6/13/2017  8:00 AM   Alcohol Cap Used Yes 6/13/2017  8:00 AM       Peripheral IV 06/12/17 Right; Outer Antecubital (Active)   Site Assessment Clean, dry, & intact 6/13/2017  4:00 PM   Phlebitis Assessment 0 6/13/2017  4:00 PM   Infiltration Assessment 0 6/13/2017  4:00 PM   Dressing Status Clean, dry, & intact 6/13/2017  4:00 PM   Dressing Type Transparent 6/13/2017  8:00 AM   Hub Color/Line Status Pink;Flushed 6/13/2017  8:00 AM   Action Taken Open ports on tubing capped 6/13/2017  8:00 AM   Alcohol Cap Used Yes 6/13/2017  8:00 AM       Peripheral IV 06/12/17 Left; Outer Antecubital (Active)   Site Assessment Clean, dry, & intact 6/13/2017  4:00 PM   Phlebitis Assessment 0 6/13/2017  4:00 PM Infiltration Assessment 0 6/13/2017  4:00 PM   Dressing Status Clean, dry, & intact 6/13/2017  4:00 PM   Dressing Type Transparent 6/13/2017  8:00 AM   Hub Color/Line Status Pink;Flushed 6/13/2017  8:00 AM   Action Taken Open ports on tubing capped 6/13/2017  8:00 AM   Alcohol Cap Used Yes 6/13/2017  8:00 AM        Opportunity for questions and clarification was provided.       Patient to be transported by evening ICU nurse

## 2017-06-13 NOTE — PROGRESS NOTES
[facility] Dosing Services Update Note      Pharmacy dosing Vancomycin IV empirically for treatment of sepsis of unknown origin      Day of Therapy: 1     Loading dose of 2gm IV x1     Cultures:   NGTD     Labs:   Bun/Serum Creatinine - 35 mg/dl / 1.40 mg/dl  CrCl - 61.4 ml/min     Plan: Goal trough 15-20. Initiate 1.75g IV q18hrs. Renal function improved. Draw trough on 6/15. Pharmacy to follow and will make changes to dose and/or frequency based on clinical status. Thanks for the consult.

## 2017-06-13 NOTE — PROGRESS NOTES
[facility] Dosing Services     Pharmacy adjusting dose for Piperacillin-Tazobactam (Zosyn) per renal protocol. Was on a regimen of: 4.5 gm IV q6h    Labs:   Serum Creatinine/CrCl: 1.40mg/dl/61.4ml/min      Plan:  Changed Zosyn to 4.5 IV 8h EXTENDED 4 HOUR INFUSION. Pharmacy to follow and will redose based on renal function changes.     Thank you

## 2017-06-13 NOTE — ED NOTES
Patient attempted to remove hager. Was stopped by staff. After pulling on hager catheter small amounts of bleeding occurred from the meatus of the penis.   Dr. Maverick Barfield notified and orders received

## 2017-06-14 PROBLEM — E87.20 LACTIC ACIDOSIS: Status: ACTIVE | Noted: 2017-01-01

## 2017-06-14 PROBLEM — D69.6 THROMBOCYTOPENIA (HCC): Status: ACTIVE | Noted: 2017-01-01

## 2017-06-14 PROBLEM — G93.41 ACUTE METABOLIC ENCEPHALOPATHY: Status: ACTIVE | Noted: 2017-01-01

## 2017-06-14 PROBLEM — R65.20 SEVERE SEPSIS (HCC): Status: ACTIVE | Noted: 2017-01-01

## 2017-06-14 NOTE — PROGRESS NOTES
Problem: Mobility Impaired (Adult and Pediatric)  Goal: *Acute Goals and Plan of Care (Insert Text)  Physical Therapy Goals  Initiated 6/14/2017 and to be accomplished within 7 day(s)  1. Patient will move from supine to sit and sit to supine , scoot up and down and roll side to side in bed with supervision/set-up. 2. Patient will transfer from bed to chair and chair to bed with minimal assistance/contact guard assist using the least restrictive device. 3. Patient will perform sit to stand with minimal assistance/contact guard assist.  4. Patient will ambulate with minimal assistance/contact guard assist for 75 feet with the least restrictive device. Outcome: Progressing Towards Goal  PHYSICAL THERAPY EVALUATION     Patient: Claudetta Amen (65 y.o. male)  Date: 6/14/2017  Primary Diagnosis: Sepsis ([facility] [address]  UTI (urinary tract infection)        Precautions:   Fall, Skin      PROBLEM LIST:  Patient presents with the following problems:   Bed Mobility, Transfers, Gait, Strength, Balance, Precautions and safety awareness   ASSESSMENT :   Patient requires between moderate assistance  and minimal assistance/contact guard assist for bed mobility, transfers and ambulation. Limited ambulation due to  cardene iv and Iv in both UE's only able to side step with constant tactile, verbal and visual cues. Patient reports no pain  Left in bed in chair position  Checking with nursing if patient could attempt to sit at edge of bed for meals she stated patient just out of restraints . education on safety and positioning in bed needs reinforcement. Patient will benefit from skilled intervention to address the above impairments.   Patients rehabilitation potential is considered to be Fair  Factors which may influence rehabilitation potential include:   [ ]         None noted  [ ]         Mental ability/status  [X]         Medical condition  [ ]         Home/family situation and support systems  [ ]         Safety awareness  [ ] Pain tolerance/management  [ ]         Other:        PLAN :  Recommendations and Planned Interventions:  [X]           Bed Mobility Training             [X]    Neuromuscular Re-Education  [X]           Transfer Training                   [ ]    Orthotic/Prosthetic Training  [X]           Gait Training                          [ ]    Modalities  [X]           Therapeutic Exercises          [ ]    Edema Management/Control  [X]           Therapeutic Activities            [X]    Patient and Family Training/Education  [ ]           Other (comment):     Frequency/Duration: Patient will be followed by physical therapy 3-5 times a week to address goals. Discharge Recommendations: Home Health and To Be Determined  Further Equipment Recommendations for Discharge: N/A       SUBJECTIVE:   Patient stated .      OBJECTIVE DATA SUMMARY:       Past Medical History:   Diagnosis Date    Alzheimer's dementia without behavioral disturbance      BPH (benign prostatic hypertrophy)      CAD (coronary artery disease)      Delirium superimposed on dementia      Elevated PSA      Esophageal reflux      Essential hypertension      Heart disease      Hematuria      Hyperlipidemia      Kidney stone      Lower urinary tract symptoms (LUTS)      Neurogenic bladder      Organic brain syndrome       mild    Prostate disease      Thrombocytopenia ([address]      Vision decreased       Past Surgical History:   Procedure Laterality Date    CARDIAC SURG PROCEDURE UNLIST        HX COLONOSCOPY         x 3.      HX HEART CATHETERIZATION   09/23/2013    HX HERNIA REPAIR   1996 and 2013    HX TONSILLECTOMY         Barriers to Learning/Limitations: yes;  physical and altered mental status (i.e.Sedation, Confusion)  Compensate with: visual, verbal, tactile, kinesthetic cues/model     G CODES:Mobility  Current  CL= 60-79%   Goal  CJ= 20-39%.   The severity rating is based on the Other [employer] Scale2/5   [facility] Balance Scale2/5  0: Pt performs 25% or less of standing activity (Max assist) CN, 100% impaired. 1: Pt supports self with upper extremities but requires therapist assistance. Pt performs 25-50% of effort (Mod assist) CM, 80% to <100% impaired. 1+: Pt supports self with upper extremities but requires therapist assistance. Pt performs >50% effort. (Min assist). CL, 60% to <80% impaired. 2: Pt supports self independently with both upper extremities (walker, crutches, parallel bars). CL, 60% to <80% impaired. 2+: Pt support self independently with 1 upper extremity (cane, crutch, 1 parallel bar). CK, 40% to <60% impaired. 3: Pt stands without upper extremity support for up to 30 seconds. CK, 40% to <60% impaired. 3+: Pt stands without upper extremity support for 30 seconds or greater. CJ, 20% to <40% impaired. 4: Pt independently moves and returns center of gravity 1-2 inches in one plane. CJ, 20% to <40% impaired. 4+: Pt independently moves and returns center of gravity 1-2 inches in multiple planes. CI, 1% to <20% impaired. 5: Pt independently moves and returns center of gravity in all planes greater than 2 inches. CH, 0% impaired.         Eval Complexity: History: HIGH Complexity :3+ comorbidities / personal factors will impact the outcome/ POC Exam:MEDIUM Complexity : 3 Standardized tests and measures addressing body structure, function, activity limitation and / or participation in recreation  Presentation: MEDIUM Complexity : Evolving with changing characteristics  Clinical Decision Making:Medium Complexity [facility] Standing Balance Scale2/5 Overall Complexity:MEDIUM     Prior Level of Function/Home Situation:  Needs supervision with gait and transfers  At [address]: [address] [place] [address] Name: [facility]   One/Two Story Residence: One story  Living Alone: No  Support Systems: Assisted living, Child(mary), Family member(s)  Patient Expects to be Discharged to[de-identified] Skilled nursing facility  Current DME Used/Available at Home: loren Kim  Critical Behavior:  Neurologic State: Alert;Confused  Orientation Level: Oriented to person  Cognition: Impulsive;Poor safety awareness;Decreased attention/concentration;Decreased command following  Safety/Judgement: Fall prevention  Psychosocial  Patient Behaviors: Calm;Confused  Needs Expressed: Cultural;Emotional;Educational  Purposeful Interaction: Yes  Pt Identified Daily Priority: Clinical issues (comment)  Car[employer] Process: Establish trust;Attend basic human needs;Create healing environment  Caring Interventions: Reassure  Reassure: Caring rounds; Informing; Sit with patient  Therapeutic Modalities: Intentional therapeutic touch  Skin Condition/Temp: Flaky;Dry  Skin Integumentary  Skin Color: Appropriate for ethnicity  Skin Condition/Temp: Flaky;Dry  Strength:    Strength: Generally decreased, functional ( 3+/5 both legs )  Tone & Sensation:   Tone: Normal  Range Of Motion:  AROM: Generally decreased, functional (decreased end range of both knee )  PROM: Within functional limits  Functional Mobility:  Bed Mobility:  Supine to Sit: Minimum assistance; Moderate assistance  Sit to Supine: Minimum assistance; Moderate assistance  Scooting: Moderate assistance;Maximum assistance;Assist x2; Additional time  Transfers:  Sit to Stand: Moderate assistance;Minimum assistance; Additional time;Assist x1  Stand to Sit: Minimum assistance; Moderate assistance; Additional time;Assist x1  Balance:   Sitting: Impaired  Sitting - Static: Good (unsupported); Fair (occasional)  Sitting - Dynamic: Fair (occasional)  Standing: Impaired  Standing - Static: Fair  Standing - Dynamic : Poor  Ambulation/Gait Training:  Distance (ft): 3 Feet (ft) (x2)  Assistive Device: Walker, rolling  Ambulation - Level of Assistance: Minimal assistance; Moderate assistance; Additional time;Assist x1  Gait Description (WDL): Exceptions to WDL  Gait Abnormalities: Decreased step clearance; Path deviations; Step to gait (side step  iv lines limit mobility)  Base of Support: Center of gravity altered  Speed/Cinthya: Slow  Step Length: Left shortened;Right shortened  Interventions: Safety awareness training;Verbal cues; Tactile cues; Visual/Demos  Pain:  Pre treatment pain level:0  Post treatment pain level:0  Pain Scale 1: Numeric (0 - 10)  Pain Intensity 1: 0  Activity Tolerance:   fair  Please refer to the flowsheet for vital signs taken during this treatment. After treatment:   [ ]         Patient left in no apparent distress sitting up in chair  [X]         Patient left in no apparent distress in bed  [X]         Call bell left within reach  [X]         Nursing notified  [ ]         Caregiver present  [ ]         Bed alarm activated      COMMUNICATION/EDUCATION:   [X]         Fall prevention education was provided and the patient/caregiver indicated understanding. [ ]         Patient/family have participated as able in goal setting and plan of care. [X]         Patient/family agree to work toward stated goals and plan of care. [X]         Patient understands intent and goals of therapy, but is neutral about his/her participation. [ ]         Patient is unable to participate in goal setting and plan of care. Patient educated on the role of physical therapy during the acute stay  and the importance of mobility. Needs reinforcement.   No family present to educate        Thank you for this referral.  Juanito Spencer, PT   Time Calculation: 24 mins

## 2017-06-14 NOTE — PROGRESS NOTES
Bedside and Verbal shift change report received from Ernie Colindres (offgoing nurse). Report included the following information SBAR, Kardex, Intake/Output, MAR and Recent Results. Patient was transferred from ICU, alert and oriented x2, on NC, restraints on, no complaints pain, will continue to monitor. 2053-Assessment completed, pt resting in bed, cardizem drip started, HR 130s, lovenox held, zosyn administered, will continue to monitor patient. 2249-Scheduled medications administered, pt tolerated well.    0100-Reassessment completed. 0433-Dr Keller notified of pt HR sustaining in 120s, orders received to increase rate to 12.5mg/hr. Bedside and Verbal shift change report given to Jonathan Beltrán RN (oncoming nurse) by Ivana Herzog RN (offgoing nurse). Report included the following information SBAR, Kardex, Intake/Output, MAR and Recent Results.

## 2017-06-14 NOTE — PROGRESS NOTES
Internal Medicine Progress Note    Patient's Name: Jakob Matos  Admit Date: 6/12/2017  Length of Stay: 2      Assessment/Plan     Active Hospital Problems    Diagnosis Date Noted    Acute metabolic encephalopathy 60/52/4866    Lactic acidosis 06/14/2017    Thrombocytopenia ([address] 06/14/2017    Bacteremia 06/13/2017    Essential hypertension 06/13/2017    BPH (benign prostatic hypertrophy) 06/13/2017    Hyperlipidemia 06/13/2017    Alzheimer's dementia without behavioral disturbance 06/13/2017    CAD (coronary artery disease) 06/13/2017    Atrial fibrillation with rapid ventricular response ([address] 06/13/2017    Hypokalemia 06/13/2017    Acute kidney failure ([address] 06/13/2017    Hypernatremia 06/13/2017    Obesity (BMI 30.0-34.9) 06/13/2017    UTI (urinary tract infection) 06/12/2017    Severe sepsis ([address] 06/12/2017     - Cont extended GNR coverage until culture back, suspect ecoli  - Repeat blood cultures NGTD  - Cont IVFs  - Cont dilt gtt, convert to PO when able  - Appreciate cardio recs  - Hold off on full anticoagulation at this point as possible convert with tx of sepsis  - Replete electrolytes  - Cont hager cath given frequent UTIs and possible hx of neurogenic bladder  - t/c urology consult  - Hgb and plt stable  - Cont acceptable home medications for chronic conditions   - SCDs    I have personally reviewed all pertinent labs and films that have officially resulted over the last 24 hours. I have personally checked for all pending labs that are awaiting final results.     Subjective     Pt s/e @ bedside  Remains in afib, better controlled  Denies CP or SOB  Denies abd pain    Objective     Visit Vitals    BP (!) 165/106 (BP 1 Location: Left arm)    Pulse (!) 115    Temp 99.2 °F (37.3 °C)    Resp 20    Ht 6' 2\" (1.88 m)    Wt 124 kg (273 lb 4.8 oz)    SpO2 91%    BMI 35.09 kg/m2       Physical Exam:  General Appearance: NAD, confused  Neck: No JVD, supple  Lungs: CTA with normal respiratory effort  CV: IRIR, no m/r/g  Abdomen: soft, non-tender, obese, normal bowel sounds  Extremities: no cyanosis, moves ext    Intake and Output:  Current Shift:  06/14 0701 - 06/14 1900  In: 4081.6 [P.O.:940; I.V.:3141.6]  Out: 1100 [Urine:1100]  Last three shifts:  06/12 1901 - 06/14 0700  In: 2645 [I.V.:2645]  Out: 7970 [Urine:3990]    Lab/Data Reviewed:  CMP:   Lab Results   Component Value Date/Time     (H) 06/14/2017 05:10 AM    K 3.4 (L) 06/14/2017 05:10 AM     (H) 06/14/2017 05:10 AM    CO2 25 06/14/2017 05:10 AM    AGAP 8 06/14/2017 05:10 AM     (H) 06/14/2017 05:10 AM    BUN 30 (H) 06/14/2017 05:10 AM    CREA 1.38 (H) 06/14/2017 05:10 AM    GFRAA >60 06/14/2017 05:10 AM    GFRNA 50 (L) 06/14/2017 05:10 AM    CA 8.1 (L) 06/14/2017 05:10 AM     CBC:   Lab Results   Component Value Date/Time    WBC 5.6 06/14/2017 05:10 AM    HGB 11.1 (L) 06/14/2017 05:10 AM    HCT 35.1 (L) 06/14/2017 05:10 AM    PLT 52 (L) 06/14/2017 05:10 AM     All Cardiac Markers in the last 24 hours: No results found for: CPK, CKMMB, CKMB, RCK3, CKMBT, CKNDX, CKND1, PALOMO, TROPT, TROIQ, JUDI, TROPT, TNIPOC, BNP, BNPP    Imaging Reviewed:  No results found.     Medications Reviewed:  Current Facility-Administered Medications   Medication Dose Route Frequency    insulin lispro (HUMALOG) injection   SubCUTAneous AC&HS    glucose chewable tablet 16 g  16 g Oral PRN    glucagon (GLUCAGEN) injection 1 mg  1 mg IntraMUSCular PRN    dextrose (D50) infusion 12.5-25 g  25-50 mL IntraVENous PRN    dextrose 5% - 0.45% NaCl with KCl 40 mEq/L infusion   IntraVENous CONTINUOUS    metoprolol tartrate (LOPRESSOR) tablet 50 mg  50 mg Oral BID    aspirin delayed-release tablet 81 mg  81 mg Oral DAILY    docusate sodium (COLACE) capsule 100 mg  100 mg Oral BID    furosemide (LASIX) tablet 40 mg  40 mg Oral DAILY    rosuvastatin (CRESTOR) tablet 5 mg  5 mg Oral QHS    piperacillin-tazobactam (ZOSYN) 4.5 g in 0.9% sodium chloride (MBP/ADV) 100 mL MBP- EXTENDED 4 HOUR INFUSION###  4.5 g IntraVENous Q8H    dutasteride (AVODART) capsule 0.5 mg  0.5 mg Oral DAILY    And    tamsulosin (FLOMAX) capsule 0.4 mg  0.4 mg Oral DAILY    dilTIAZem (CARDIZEM) 100 mg in 0.9% sodium chloride (MBP/ADV) 100 mL infusion  12.5 mg/hr IntraVENous CONTINUOUS    sodium chloride (NS) flush 5-10 mL  5-10 mL IntraVENous PRN    levoFLOXacin (LEVAQUIN) 750 mg in D5W IVPB  750 mg IntraVENous Q24H    enoxaparin (LOVENOX) injection 40 mg  40 mg SubCUTAneous Q24H           Cortez November, DO  Internal Medicine, Hospitalist  Pager: [phone]  [address] [place] [address]

## 2017-06-14 NOTE — CONSULTS
[facility] - Consult Note    Consultation request by Katherine Velasquez MD for advice/opinion related to evaluating afib    Date of  Admission: 6/12/2017  2:51 PM   Primary Care Physician:  Tracy Patterson MD      I saw, evaluated, interviewed and examined the patient personally. I agree with the findings and plan of care as documented below with PA-C note  A.fib in setting of urosepsis  ECHO once HR better  Cardizem drip and try to change to oral once better control of HR  ASA for now. Suboptimal candidate for anticoagulation. Depending on hospital course. Mirna Olvera MD             Assessment:     - Urosepsis   - Bacteremia, 2/2 BC with GNR 06/12/17  - Atrial fibrillation with RVR. CHADS2-VASC 4 (+age, +HTN, +CAD)  - Nuclear stress 09/2016 with mid-basal inferolateral ischemia  - Alzheimer Dementia  - CAD: per [facility] report, cardiac cath in Jan 2010 with triple vessel disease. Severe stenosis in RCA, LAD and OM1 branch. No record of CABG. - Anemia  - Hypokalemia  - Hypernatremia  - BREANNE     Plan:     - Continue with Cardizem gtt  - Check echo, will need to wait until rates improve  - Afib will be difficult to control with sepsis, but will increase beta blocker dose as he has been hypertensive  - Would be poor candidate for anticoagulation with dementia. ASA only for now. - Continue with PO Lasix and statin     History of Present Illness: This is a 67 y.o. male admitted for Sepsis ([facility] [address]  UTI (urinary tract infection). Patient complains of:  Seen in consult for afib    Patient does not provide history as he has Alzheimer's Dementia. This is a 67year old male with a history of 3 Vessel CAD that was admitted to [place] [address] for urosepsis. He resides in a nursing home and was not acting himself and had a fever. He has positive blood cultures with gram negative rods and afib RVR.        Cardiac risk factors: dyslipidemia, male gender, hypertension    Review of systems not obtained due to patient factors. Past Medical History:     Past Medical History:   Diagnosis Date    Alzheimer's dementia without behavioral disturbance     BPH (benign prostatic hypertrophy)     CAD (coronary artery disease)     Delirium superimposed on dementia     Elevated PSA     Esophageal reflux     Essential hypertension     Heart disease     Hematuria     Hyperlipidemia     Kidney stone     Lower urinary tract symptoms (LUTS)     Neurogenic bladder     Organic brain syndrome     mild    Prostate disease     Thrombocytopenia ([address]     Vision decreased          Social History:     Social History     Social History    Marital status:      Spouse name: N/A    Number of children: N/A    Years of education: N/A     Social History Main Topics    Smoking status: Never Smoker    Smokeless tobacco: Never Used    Alcohol use No    Drug use: No    Sexual activity: Not on file     Other Topics Concern    Not on file     Social History Narrative        Family History:   No family history on file.      Medications:   No Known Allergies     Current Facility-Administered Medications   Medication Dose Route Frequency    insulin lispro (HUMALOG) injection   SubCUTAneous AC&HS    glucose chewable tablet 16 g  16 g Oral PRN    glucagon (GLUCAGEN) injection 1 mg  1 mg IntraMUSCular PRN    dextrose (D50) infusion 12.5-25 g  25-50 mL IntraVENous PRN    dextrose 5% - 0.45% NaCl with KCl 40 mEq/L infusion   IntraVENous CONTINUOUS    aspirin delayed-release tablet 81 mg  81 mg Oral DAILY    docusate sodium (COLACE) capsule 100 mg  100 mg Oral BID    furosemide (LASIX) tablet 40 mg  40 mg Oral DAILY    metoprolol tartrate (LOPRESSOR) tablet 25 mg  25 mg Oral BID    rosuvastatin (CRESTOR) tablet 5 mg  5 mg Oral QHS    piperacillin-tazobactam (ZOSYN) 4.5 g in 0.9% sodium chloride (MBP/ADV) 100 mL MBP- EXTENDED 4 HOUR INFUSION###  4.5 g IntraVENous Q8H    dutasteride (AVODART) capsule 0.5 mg  0.5 mg Oral DAILY    And    tamsulosin (FLOMAX) capsule 0.4 mg  0.4 mg Oral DAILY    dilTIAZem (CARDIZEM) 100 mg in 0.9% sodium chloride (MBP/ADV) 100 mL infusion  12.5 mg/hr IntraVENous CONTINUOUS    sodium chloride (NS) flush 5-10 mL  5-10 mL IntraVENous PRN    levoFLOXacin (LEVAQUIN) 750 mg in D5W IVPB  750 mg IntraVENous Q24H    enoxaparin (LOVENOX) injection 40 mg  40 mg SubCUTAneous Q24H         Physical Exam:     Visit Vitals    BP (!) 165/106 (BP 1 Location: Left arm)    Pulse (!) 115    Temp 99.2 °F (37.3 °C)    Resp 20    Ht 6' 2\" (1.88 m)    Wt 273 lb 4.8 oz (124 kg)    SpO2 91%    BMI 35.09 kg/m2     BP Readings from Last 3 Encounters:   06/14/17 (!) 165/106   05/31/17 136/82   11/06/13 128/86     Pulse Readings from Last 3 Encounters:   06/14/17 (!) 115     Wt Readings from Last 3 Encounters:   06/14/17 273 lb 4.8 oz (124 kg)   05/31/17 270 lb (122.5 kg)   11/06/13 270 lb (122.5 kg)       General:  flushed, disoriented  Neck:  nontender, no JVD  Lungs:  clear to auscultation bilaterally  Heart:  irregularly irregular rhythm  Abdomen:  abdomen is soft without significant tenderness, masses, organomegaly or guarding  Extremities:  extremities normal, atraumatic, no cyanosis or edema  Skin: Warm and dry.  no hyperpigmentation, vitiligo, or suspicious lesions  Neuro: no focal neurological deficits  Psych: non focal     Data Review:     Recent Labs      06/14/17   0510  06/13/17   0345  06/12/17   1530   WBC  5.6  5.8  4.3*   HGB  11.1*  10.9*  13.4   HCT  35.1*  34.1*  40.3   PLT  52*  53*  62*     Recent Labs      06/14/17   0510  06/13/17   1055  06/13/17   0345  06/12/17   1530   NA  152*   --   148*  144   K  3.4*   --   3.1*  3.5   CL  119*   --   115*  108   CO2  25   --   23  22   GLU  205*   --   191*  256*   BUN  30*   --   35*  41*   CREA  1.38*   --   1.40*  1.81*   CA  8.1*   --   7.8*  8.7   MG   --    --    --   2.2   PHOS   --   2.8   --    --    ALB   --    --    --   2.9*   SGOT   --    -- --   24   ALT   --    --    --   27   INR   --    --    --   1.1       Results for orders placed or performed during the hospital encounter of 06/12/17   EKG, 12 LEAD, INITIAL   Result Value Ref Range    Ventricular Rate 128 BPM    Atrial Rate 128 BPM    QRS Duration 156 ms    Q-T Interval 308 ms    QTC Calculation (Bezet) 449 ms    Calculated R Axis -58 degrees    Calculated T Axis -16 degrees    Diagnosis       Atrial fibrillation with rapid ventricular response  Left axis deviation  Right bundle branch block  Possible Lateral infarct (cited on or before 12-JUN-2017)  Inferior infarct (cited on or before 12-JUN-2017)  Abnormal ECG  When compared with ECG of 12-JUN-2017 15:08,  No significant change was found  Confirmed by [employer] ([id number]) on 6/13/2017 3:44:12 PM         All Cardiac Markers in the last 24 hours:  No results found for: CPK, CKMMB, CKMB, RCK3, CKMBT, CKNDX, CKND1, PALOMO, TROPT, TROIQ, JUDI, TROPT, TNIPOC, BNP, BNPP    Last Lipid:  No results found for: CHOL, CHOLX, CHLST, CHOLV, HDL, LDL, LDLC, DLDLP, TGLX, TRIGL, TRIGP, CHHD, CHHDX    Signed By: David Ware.  Pratik Taylor     June 14, 2017

## 2017-06-14 NOTE — PROGRESS NOTES
Bedside and Verbal shift change report received from [address] [place] [address] (offgoing nurse). Report included the following information SBAR, Kardex, Intake/Output, MAR and Recent Results. 1930-Shift assessment completed, pt resting in bed, son at bedside, no complaints of pain, Cardizem infusing, call bell within reach, will continue to monitor. 2205-Scheduled medications administered, lovenox held platelets 52.    4054-PFPPF administered, patient resting in bed, no distress noted. Bedside and Verbal shift change report given to Raj Henriquez RN (oncoming nurse) by Storm Rivera RN (offgoing nurse). Report included the following information SBAR, Kardex, Intake/Output, MAR and Recent Results.

## 2017-06-14 NOTE — PROGRESS NOTES
9632 -- Bedside and Verbal shift change report given to Margoth Mitchell (oncoming nurse) by Rossana Aaron (offgoing nurse). Report included the following information SBAR, Kardex, Procedure Summary, Intake/Output, MAR and Recent Results. Daily weigh completed. 3369 -- Paged MD Angelika Walden [phone] Blood pressure 165/105.    1000 -- RRT called increase Cardizem 15, 160/85, change IV fluids D5 0.45 NaCl 40K+ rate 125ml/hr. 1009 -- 150/87.      0919 -- AM medications administered, pt tolerated with ease, will continue to monitor.     1200 -- IDR completed with pt, plan is to continue care, order swallow evaluation.       1229 -- Shift reassessment, pt condition unchanged, will continue to monitor. Pt given partial bath, gown, socks and linen changed. 1413 -- Pt given partial bath, gown, socks and linen changed.      1633 -- Shift reassessment, pt condition unchanged, will continue to monitor.     1900 -- Bedside and Verbal shift change report given to Justine RN (oncoming nurse) by Margoth Mitchell, RN (offgoing nurse).  Report included the following information SBAR, Kardex, Procedure Summary, Intake/Output, MAR and Recent Results

## 2017-06-15 PROBLEM — N20.0 RECURRENT NEPHROLITHIASIS: Status: ACTIVE | Noted: 2017-01-01

## 2017-06-15 NOTE — PROGRESS NOTES
Speech Therapy Note:    SLP acknowledging eval & tx; however, pt NPO for procedure this afternoon at 2:30. SLP will hold today and follow up next am as indicated. Marylu Navas., [address] [facility]  Office: [phone]  Pager: [phone]

## 2017-06-15 NOTE — PROGRESS NOTES
[facility] - Consult Note    Consultation request by Jada Rodriguez MD for advice/opinion related to evaluating afib    Date of  Admission: 6/12/2017  2:51 PM   Primary Care Physician:  Trini Serna MD     Assessment:     - Urosepsis   - Bacteremia, 2/2 BC with GNR 06/12/17  - Atrial fibrillation with RVR. CHADS2-VASC 4 (+age, +HTN, +CAD)  - Nuclear stress 09/2016 with mid-basal inferolateral ischemia  - Alzheimer Dementia  - CAD: per [facility] report, cardiac cath in Jan 2010 with triple vessel disease. Severe stenosis in RCA, LAD and OM1 branch. No record of CABG. - Anemia  - Hypokalemia  - Hypernatremia  - BREANNE     Plan:     - Continue with Cardizem gtt, wean off today  - Increase metoprolol to 50 mg every 8 hours. -693g. Somewhat difficult to control because of sepsis. Past Medical History:     Past Medical History:   Diagnosis Date    Alzheimer's dementia without behavioral disturbance     BPH (benign prostatic hypertrophy)     CAD (coronary artery disease)     Delirium superimposed on dementia     Elevated PSA     Esophageal reflux     Essential hypertension     Heart disease     Hematuria     Hyperlipidemia     Kidney stone     Lower urinary tract symptoms (LUTS)     Neurogenic bladder     Organic brain syndrome     mild    Prostate disease     Thrombocytopenia ([address]     Vision decreased          Social History:     Social History     Social History    Marital status:      Spouse name: N/A    Number of children: N/A    Years of education: N/A     Social History Main Topics    Smoking status: Never Smoker    Smokeless tobacco: Never Used    Alcohol use No    Drug use: No    Sexual activity: Not on file     Other Topics Concern    Not on file     Social History Narrative        Family History:   No family history on file.      Medications:   No Known Allergies     Current Facility-Administered Medications   Medication Dose Route Frequency    dextrose 5% with KCl 40 mEq/L infusion   IntraVENous CONTINUOUS    metoprolol tartrate (LOPRESSOR) tablet 50 mg  50 mg Oral Q8H    insulin lispro (HUMALOG) injection   SubCUTAneous AC&HS    glucose chewable tablet 16 g  16 g Oral PRN    glucagon (GLUCAGEN) injection 1 mg  1 mg IntraMUSCular PRN    dextrose (D50) infusion 12.5-25 g  25-50 mL IntraVENous PRN    aspirin delayed-release tablet 81 mg  81 mg Oral DAILY    docusate sodium (COLACE) capsule 100 mg  100 mg Oral BID    furosemide (LASIX) tablet 40 mg  40 mg Oral DAILY    rosuvastatin (CRESTOR) tablet 5 mg  5 mg Oral QHS    piperacillin-tazobactam (ZOSYN) 4.5 g in 0.9% sodium chloride (MBP/ADV) 100 mL MBP- EXTENDED 4 HOUR INFUSION###  4.5 g IntraVENous Q8H    dutasteride (AVODART) capsule 0.5 mg  0.5 mg Oral DAILY    And    tamsulosin (FLOMAX) capsule 0.4 mg  0.4 mg Oral DAILY    dilTIAZem (CARDIZEM) 100 mg in 0.9% sodium chloride (MBP/ADV) 100 mL infusion  10 mg/hr IntraVENous CONTINUOUS    sodium chloride (NS) flush 5-10 mL  5-10 mL IntraVENous PRN    levoFLOXacin (LEVAQUIN) 750 mg in D5W IVPB  750 mg IntraVENous Q24H    enoxaparin (LOVENOX) injection 40 mg  40 mg SubCUTAneous Q24H         Physical Exam:     Visit Vitals    BP (!) 170/102    Pulse (!) 111    Temp 99.1 °F (37.3 °C)    Resp 20    Ht 6' 2\" (1.88 m)    Wt 264 lb 12.8 oz (120.1 kg)    SpO2 95%    BMI 34 kg/m2     BP Readings from Last 3 Encounters:   06/15/17 (!) 170/102   05/31/17 136/82   11/06/13 128/86     Pulse Readings from Last 3 Encounters:   06/15/17 (!) 111     Wt Readings from Last 3 Encounters:   06/15/17 264 lb 12.8 oz (120.1 kg)   05/31/17 270 lb (122.5 kg)   11/06/13 270 lb (122.5 kg)       General:  flushed, disoriented  Neck:  nontender, no JVD  Lungs:  clear to auscultation bilaterally  Heart:  irregularly irregular rhythm  Abdomen:  abdomen is soft without significant tenderness, masses, organomegaly or guarding  Extremities:  extremities normal, atraumatic, no cyanosis or edema  Skin: Warm and dry.  no hyperpigmentation, vitiligo, or suspicious lesions  Neuro: no focal neurological deficits  Psych: non focal     Data Review:     Recent Labs      06/15/17   0540  06/14/17   0510  06/13/17   0345   WBC  5.7  5.6  5.8   HGB  10.9*  11.1*  10.9*   HCT  34.5*  35.1*  34.1*   PLT  65*  52*  53*     Recent Labs      06/15/17   0540  06/14/17   0510  06/13/17   1055  06/13/17   0345  06/12/17   1530   NA  150*  152*   --   148*  144   K  3.1*  3.4*   --   3.1*  3.5   CL  116*  119*   --   115*  108   CO2  25  25   --   23  22   GLU  196*  205*   --   191*  256*   BUN  29*  30*   --   35*  41*   CREA  1.22  1.38*   --   1.40*  1.81*   CA  7.9*  8.1*   --   7.8*  8.7   MG   --    --    --    --   2.2   PHOS   --    --   2.8   --    --    ALB   --    --    --    --   2.9*   SGOT   --    --    --    --   24   ALT   --    --    --    --   27   INR   --    --    --    --   1.1       Results for orders placed or performed during the hospital encounter of 06/12/17   EKG, 12 LEAD, INITIAL   Result Value Ref Range    Ventricular Rate 128 BPM    Atrial Rate 128 BPM    QRS Duration 156 ms    Q-T Interval 308 ms    QTC Calculation (Bezet) 449 ms    Calculated R Axis -58 degrees    Calculated T Axis -16 degrees    Diagnosis       Atrial fibrillation with rapid ventricular response  Left axis deviation  Right bundle branch block  Possible Lateral infarct (cited on or before 12-JUN-2017)  Inferior infarct (cited on or before 12-JUN-2017)  Abnormal ECG  When compared with ECG of 12-JUN-2017 15:08,  No significant change was found  Confirmed by Connie Akins ([id number]) on 6/13/2017 3:44:12 PM         All Cardiac Markers in the last 24 hours:  No results found for: CPK, CKMMB, CKMB, RCK3, CKMBT, CKNDX, CKND1, PALOMO, TROPT, TROIQ, JUDI, TROPT, TNIPOC, BNP, BNPP    Last Lipid:  No results found for: CHOL, CHOLX, CHLST, CHOLV, HDL, LDL, LDLC, DLDLP, TGLX, TRIGL, TRIGP, CHHD, CHHDX    Signed By: Jeanine Oconnor MD     Uyen 15, 2017

## 2017-06-15 NOTE — CONSULTS
Urology Consult      Assessment:     Principal Problem:    Severe sepsis ([address] (6/12/2017)    Active Problems:    UTI (urinary tract infection) (6/12/2017)      Bacteremia (6/13/2017)      Essential hypertension (6/13/2017)      BPH (benign prostatic hypertrophy) (6/13/2017)      Hyperlipidemia (6/13/2017)      Alzheimer's dementia without behavioral disturbance (6/13/2017)      CAD (coronary artery disease) (6/13/2017)      Atrial fibrillation with rapid ventricular response ([address] (6/13/2017)      Hypokalemia (6/13/2017)      Acute kidney failure ([address] (6/13/2017)      Hypernatremia (6/13/2017)      Obesity (BMI 30.0-34.9) (6/13/2017)      Acute metabolic encephalopathy (0/15/4175)      Lactic acidosis (6/14/2017)      Thrombocytopenia (HCC) (6/14/2017)      Left distal 5 mm ureteral calculus with associated UTI and sepsis    Frequent UTIs    Plan:   Patient improving clinically on ABx (HR, BP and temp curve improved) so unclear if distal ureteral calculus was passed. However, given the nature of the disease with a UTI, would be prudent to undergo cysto and left JJ stent placement regardless in order to minimize risk of future septic episode if stone did not pass. Will plan for repeat renal ultrasound this morning and added on for cysto and left stent placement regardless of findings. All r/b/a explained to patient's wife, who signs his consents for him Jordyskye Aminah, [phone]). She understands that the anesthetic risk is likely higher than the risks from the procedure itself as well as the possibility of the patient already passing the stone. Patient ate 2 bites of [place]ic toast at 8 am this morning - will speak with anesthesia team about timing of add on as this is an urgent case and should not be delayed more than late afternoon. Etiology of frequent UTIs can be worked up as outpatient with Dr. Jurgen Villalta.   Possible etiology includes bacteruria from stone colonization or prostate colonization - may consider placing on suppressive Abx given high risk. All questions answered. Will follow after procedure. Signed By: Cherelle Izquierdo MD                         Uyen 15, 2017  Cherelle Izquierdo MD   Urology Fellow  [facility]  Pager [phone]      Subjective:     Date of Consultation:  Uyen 15, 2017    Referring Physician: Dr. Ioana Arroyo    Reason for Consultation:  Frequent UTIs    Patient Name: Michelle Ledbetter  MRN: [id number]    History of Present Illness:   Patient is a 67 y.o.  male who is being seen for frrquent UTIs. He was admitted to the hospital for Sepsis ([facility]);UTI (urinary tract infection) 3 days ago. A CT scan performed in the ER 3 days ago demonstrated a 5 mm distal left ureteral calculus with mild hydronephrosis. Urology was consulted today for assistance in management of the patient's sepsis. He is well known to the practice, followed by Dr. Arianna Sandoval for frequent UTIs, last seen in April of this year. This patient is a poor historian and is unable to give an appropriate history. Some history obtained from his wife (see A/P). Past Medical History:   Diagnosis Date    Alzheimer's dementia without behavioral disturbance     BPH (benign prostatic hypertrophy)     CAD (coronary artery disease)     Delirium superimposed on dementia     Elevated PSA     Esophageal reflux     Essential hypertension     Heart disease     Hematuria     Hyperlipidemia     Kidney stone     Lower urinary tract symptoms (LUTS)     Neurogenic bladder     Organic brain syndrome     mild    Prostate disease     Thrombocytopenia ([address]     Vision decreased       Past Surgical History:   Procedure Laterality Date    CARDIAC SURG PROCEDURE UNLIST      HX COLONOSCOPY      x 3.      HX HEART CATHETERIZATION  09/23/2013    [address] and 2013    HX TONSILLECTOMY        No family history on file.    Social History   Substance Use Topics  Smoking status: Never Smoker    Smokeless tobacco: Never Used    Alcohol use No     No Known Allergies   Prior to Admission medications    Medication Sig Start Date End Date Taking? Authorizing Provider   docusate sodium (COLACE) 100 mg capsule Take 100 mg by mouth two (2) times a day. Historical Provider   finasteride (PROSCAR) 5 mg tablet Take 5 mg by mouth daily. Historical Provider   furosemide (LASIX) 40 mg tablet Take  by mouth daily. Historical Provider   metoprolol tartrate (LOPRESSOR) 25 mg tablet Take  by mouth two (2) times a day. Historical Provider   potassium chloride (KLOR-CON) 20 mEq packet Take 20 mEq by mouth two (2) times a day. Historical Provider   Dutasteride-Tamsulosin (DOMINGA) 0.5-0.4 mg CM24 Take 1 Cap by mouth daily (after dinner). 11/29/12   Ivy Ramirez MD   [facility]) 10 mg tablet Take  by mouth daily. Historical Provider   aspirin 81 mg tablet Take 81 mg by mouth. Historical Provider   rosuvastatin (CRESTOR) 5 mg tablet Take  by mouth nightly. Historical Provider         Review of Systems:  Pertinent items are noted in HPI. Patient is a poor historian. Review of Systems  Constitutional: Fever:   Skin: Rash:    HEENT: Hearing difficulty:   Eyes: Blurred vision:   Cardiovascular: Chest pain:   Respiratory: Shortness of breath:   Gastrointestinal: Nausea/vomiting:   Musculoskeletal: Back pain:   Neurological: Weakness:   Psychological: Memory loss:   Comments/additional findings:       Objective:     Data Review (Labs):    Recent Labs      06/15/17   0540  06/14/17   0510  06/13/17   0345  06/12/17   1530   WBC  5.7  5.6  5.8  4.3*   HGB  10.9*  11.1*  10.9*  13.4   MCV  92.7  91.9  90.2  89.8   PLT  65*  52*  53*  62*   NA  150*  152*  148*  144   K  3.1*  3.4*  3.1*  3.5   CREA  1.22  1.38*  1.40*  1.81*   BUN  29*  30*  35*  41*   ALB   --    --    --   2.9*   SGOT   --    --    --   24   AP   --    --    --   128*   INR   --    --    -- 1.1       Patient Vitals for the past 8 hrs:   BP Temp Pulse Resp SpO2 Weight   06/15/17 0903 (!) 170/102 99.1 °F (37.3 °C) (!) 111 20 95 % -   06/15/17 0737 - - 99 - - -   06/15/17 0600 (!) 160/98 98.5 °F (36.9 °C) (!) 107 20 94 % 264 lb 12.8 oz (120.1 kg)   06/15/17 0251 (!) 154/96 - - 20 96 % -   06/15/17 0218 169/87 97.7 °F (36.5 °C) (!) 113 26 97 % -     Temp (24hrs), Av °F (37.2 °C), Min:97.7 °F (36.5 °C), Max:100.1 °F (37.8 °C)      Intake and Output:    1901 - 06/15 0700  In: 6708.1 [P.O.:1180; I.V.:5528.1]  Out: 2900 [Urine:2900]    Physical Exam:            General:    alert, cooperative, no distress, appears stated age                     Skin:  Normal.   Lymph nodes:  Inguinal adenopathy: none             Abdomen[de-identified]  soft, non-tender, non-distended, No masses,  no organomegaly             :  Archuleta draining clear yellow urine          Extremities:  No edema         Imaging (reviewed): EXAM: CT of the abdomen and pelvis     INDICATION: Altered mental status     COMPARISON: Without     TECHNIQUE: Axial CT imaging of the abdomen and pelvis was performed without  intravenous contrast. Multiplanar reformats were generated.     DOSE REDUCTION: One or more dose reduction techniques were used on this CT:  automated exposure control, adjustment of the mAs and/or kVp according to  patient's size, and iterative reconstruction techniques. The specific techniques  utilized on this CT exam have been documented in the patient's electronic  medical record.     _______________     FINDINGS:     LOWER CHEST: There is bibasilar atelectasis. There is a moderate size hiatal  hernia. Along the posterior right aspect of this hiatal hernia there is a soft  tissue density in the herniated omental fat measuring 3.9 x 2 cm. This is  unclear etiology.     LIVER, BILIARY: Liver is normal. No biliary dilation.  Tiny gallstone is seen in  the neck of the gallbladder measuring 6 mm.     PANCREAS: Normal.     SPLEEN: Spleen is enlarged measuring 15.7 cm.     ADRENALS: Unremarkable     KIDNEYS/URETERS: Right kidney is unremarkable. There is perinephric stranding  around the left kidney. There is a nonobstructive calculus in the midpole of the  left kidney measuring 5 mm. There is mild hydronephrosis and hydroureter down to  the left distal ureter where there is an 5 mm calcification image 123. This may  represent a phlebolith or a distal ureteral stone. .     VASCULATURE: Moderate atherosclerosis present.     LYMPH NODES: There are no pathologic sized lymph nodes.     GASTRO INTESTINAL TRACT: There is a moderate size hiatal hernia. There is no  bowel obstruction. Colonic diverticulosis present. There is no evidence of acute  diverticulitis. Appendix is normal.     PELVIC ORGANS/BLADDER: There is a Archuleta catheter within the bladder. Prostate  gland is mildly enlarged.     BONES: No acute or aggressive osseous abnormalities identified. There is diffuse  osteopenia. There is a probable Tarlov cyst in the sacrum measuring  approximately 5 x 2.5 cm.     OTHER: None.     _______________     IMPRESSION  IMPRESSION:     1. Mild hydronephrosis and hydroureter on the left. Along the course of left  ureter there is a 5 mm calcific density which may represent a distal ureteral  obstructing calculus or a phlebolith. No other bladder calculi seen.     Enlarged prostate with Archuleta in place     Moderate size hiatal hernia.     Soft tissue density is noted along the right posterior aspect of this hiatal  hernia of unclear etiology. This may represent fluid or other soft tissue  density.     Enlarged spleen     Gallstone in the neck of the gallbladder. No CT evidence for acute  cholecystitis.     Colonic diverticulosis without diverticulitis. .  Dayana Patel MD

## 2017-06-15 NOTE — PERIOP NOTES
1722:  Patient received from OR on a bed. Attached to monitor. Elevated HR. Anesthesia aware. Attached to oxygen via non-rebreather at 10L. OR report, anesthesia report and MAR acknowledged. Will continue to monitor.

## 2017-06-15 NOTE — PROGRESS NOTES
Patient is unable to communicate at this time as he is resting peacefully at present.  offered prayer at doorway. No family seen at this visit. Chaplains will continue to follow and will provide pastoral care on an as needed/requested basis.     Jan Post   Spiritual Care   [phone]

## 2017-06-15 NOTE — PERIOP NOTES
TRANSFER - IN REPORT:    Verbal report received from [facility] on Comfort French  being received from 3007 for routine progression of care      Report consisted of patients Situation, Background, Assessment and   Recommendations(SBAR). Information from the following report(s) SBAR was reviewed with the receiving nurse. Opportunity for questions and clarification was provided. Assessment completed upon patients arrival to unit and care assumed.        Consents signed by spouse (on the chart)  BS - 197 (3 units given by Martha Gutierrez RN)  Archuleta - draining  IV Rt AC - infusing (Cardizem drip)  No ABX ordered by Dr Dwight Werner has been NPO  Wife @ bedside

## 2017-06-15 NOTE — PROGRESS NOTES
5698 -- Bedside and Verbal shift change report given to Marianna Rowan (oncoming nurse) by Cora Salgado (offgoing nurse). Report included the following information SBAR, Kardex, Procedure Summary, Intake/Output, MAR and Recent Results. Daily weigh completed. 3407 -- Contacted Pharm, may need to make not readily available D5 40 KCl, will load or call this RN back.      0850 -- AM medications administered, pt tolerated with ease, will continue to monitor.       0856 -- Contacted by MD [facility] (Urology), pt now NPO, stent to be placed today. MD provided with wife's and son's contact information for consent. 5097 -- MD Sedrick Villeda at bedside, STAT US of kidney ordered, Cardizem rate changed to 10 mg/hr. 1020 -- Pharm called D5 KCl 40 bag ready for pickup. Medication was administered. Pt given partial bath, gown, socks and linen changed.      1249 -- Shift reassessment, pt condition unchanged, will continue to monitor. 12 -- Wife leaving bedside states she needs to get dog from groomers, surgery can wait until she gets back. Advised would have MD call her for consent.  Suellen Rinne  4841 -- Pt off floor to surgery. 1720 -- Pt returned to floor. IV fluids restarted and cardiac monitor reapplied. Archuleta bag has been changed no orange sticker on bag. Placed tape with date and time onto bag.     1943 -- Bedside and Verbal shift change report given to Dequan Reynolds RN (oncoming nurse) by Marianna Rowan, RN (offgoing nurse).  Report included the following information SBAR, Kardex, Procedure Summary, Intake/Output, MAR and Recent Results

## 2017-06-15 NOTE — PERIOP NOTES
TRANSFER - OUT REPORT:    Verbal report given to Jaxon Hebert RN on Michelle Ledbetter  being transferred to [facility] ([address]) for routine progression of care       Report consisted of patients Situation, Background, Assessment and   Recommendations(SBAR). Information from the following report(s) SBAR, Kardex, Procedure Summary, MAR, Recent Results and Med Rec Status was reviewed with the receiving nurse. Lines:   Peripheral IV 06/14/17 Inner;Right Forearm (Active)   Site Assessment Clean, dry, & intact 6/15/2017 12:22 PM   Phlebitis Assessment 0 6/15/2017 12:22 PM   Infiltration Assessment 0 6/15/2017 12:22 PM   Dressing Status Clean, dry, & intact 6/15/2017 12:22 PM   Dressing Type Transparent 6/15/2017 12:22 PM   Hub Color/Line Status Pink; Infusing 6/15/2017 12:22 PM   Action Taken Open ports on tubing capped 6/15/2017 12:22 PM   Alcohol Cap Used Yes 6/15/2017 12:22 PM       Peripheral IV 06/13/17 Right; Outer Antecubital (Active)   Site Assessment Clean, dry, & intact 6/15/2017 12:44 PM   Phlebitis Assessment 0 6/15/2017 12:44 PM   Infiltration Assessment 0 6/15/2017 12:44 PM   Dressing Status Clean, dry, & intact 6/15/2017 12:44 PM   Dressing Type Transparent;Tape 6/15/2017 12:44 PM   Hub Color/Line Status Pink; Infusing 6/15/2017 12:44 PM   Action Taken Open ports on tubing capped 6/15/2017 12:22 PM   Alcohol Cap Used Yes 6/15/2017 12:22 PM        Opportunity for questions and clarification was provided.       Patient transported with:   Registered Nurse

## 2017-06-15 NOTE — ANESTHESIA POSTPROCEDURE EVALUATION
Post-Anesthesia Evaluation & Assessment    Visit Vitals    /85    Pulse 97    Temp 37.2 °C (99 °F)    Resp 15    Ht 6' 2\" (1.88 m)    Wt 120.1 kg (264 lb 12.8 oz)    SpO2 93%    BMI 34 kg/m2       Nausea/Vomiting: no nausea and no vomiting    Post-operative hydration adequate. Pain score (VAS): 0    Mental status & Level of consciousness: alert and oriented x 3    Neurological status: moves all extremities, sensation grossly intact    Pulmonary status: airway patent, no supplemental oxygen required    Complications related to anesthesia: none    Patient has met all discharge requirements. Additional comments:        Nitin Garner CRNA  Uyen 15, 2017Post-Anesthesia Evaluation and Assessment    Patient: Comfort French MRN: [id number]  SSN: [id number]    YOB: 1940  Age: 67 y.o. Sex: male      Data from PACU flowsheet    Cardiovascular Function/Vital Signs  Visit Vitals    /85    Pulse 97    Temp 37.2 °C (99 °F)    Resp 15    Ht 6' 2\" (1.88 m)    Wt 120.1 kg (264 lb 12.8 oz)    SpO2 93%    BMI 34 kg/m2       Patient is status post general anesthesia for Procedure(s):  [address]. Nausea/Vomiting: controlled    Postoperative hydration reviewed and adequate. Pain:  Pain Scale 1: Visual (06/15/17 1721)  Pain Intensity 1: 0 (06/15/17 1721)   Managed      Mental Status and Level of Consciousness: Alert and oriented     Pulmonary Status:   O2 Device: Room air (06/15/17 1752)   Adequate oxygenation and airway patent    Complications related to anesthesia: None    Post-anesthesia assessment completed.  No concerns    Signed By: Nitin Garner CRNA     Uyen 15, 2017

## 2017-06-15 NOTE — BRIEF OP NOTE
BRIEF OPERATIVE NOTE    Date of Procedure: 6/15/2017   Preoperative Diagnosis: kidney stone  Postoperative Diagnosis: * No post-op diagnosis entered *    Procedure(s):  CYSTOSCOPY LEFT JJ STENT PLACEMENT  Surgeon(s) and Role:     * Kvng Catalan MD - Primary         Assistant Staff:       Surgical Staff:  Circ-1: Anali Alexander RN  Radiology Technician: Savannah Braga  Scrub Tech-1: Austin Poplin  Event Time In   Incision Start  4:50 PM   Incision Close      Anesthesia: General   Estimated Blood Loss: Minimal  Specimens:   ID Type Source Tests Collected by Time Destination   1 :     Kvng Catalan MD 6/15/2017  5:01 PM Pathology   1 : BLADDER URINE FOR CULTURE Urine Bladder CULTURE, Yossi Auguste MD 6/15/2017  5:01 PM Microbiology      Findings: See dictation  1. Obstructive prostate  2. Inflamed bladder with 4+ trabeculations, no bladder lesions  3. Sediment - stone particles in bladder  4. Distal left ureteral calculus visualized on  film  5. Minimal to no hydronephrosis on left side, no hydronephrotic drip    Complications: None  Implants:   Implant Name Type Inv. Item Serial No.  Lot No. LRB No. Used Action   STENT URET DBL-PGTL [id number] -- Eliza Surinder - [id number]   STENT URET DBL-PGTL [id number] -- POLARIS   impok [facility] [id number] Left 1 Implanted     Drain:  16F hager      Plan:  Continue abx per medical team.  May attempt voiding trial when patient at baseline mentation, ambulating at baseline, having normal bowel movement, has documented negative urine culture, and all signs of infection resolved. Until this criteria is met, would not remove hager catheter. Trend Cr daily. Will try left ureteral and kidney stones as outpatient, will set up follow up with Dr. Miki Jones  Will re-assess and treat etiology of recurrent UTIs as outpatient.     Will see in Carolee Cain MD

## 2017-06-15 NOTE — PROGRESS NOTES
Internal Medicine Progress Note    Patient's Name: Leeroy Rojas  Admit Date: 6/12/2017  Length of Stay: 3      Assessment/Plan     Active Hospital Problems    Diagnosis Date Noted    Recurrent nephrolithiasis 06/15/2017    Acute metabolic encephalopathy 44/74/0659    Lactic acidosis 06/14/2017    Thrombocytopenia ([facility] [address] 06/14/2017    Bacteremia 06/13/2017    Essential hypertension 06/13/2017    BPH (benign prostatic hypertrophy) 06/13/2017    Hyperlipidemia 06/13/2017    Alzheimer's dementia without behavioral disturbance 06/13/2017    CAD (coronary artery disease) 06/13/2017    Atrial fibrillation with rapid ventricular response ([address] 06/13/2017    Hypokalemia 06/13/2017    Acute kidney failure ([address] 06/13/2017    Hypernatremia 06/13/2017    Obesity (BMI 30.0-34.9) 06/13/2017    UTI (urinary tract infection) 06/12/2017    Severe sepsis ([address] 06/12/2017     - Ecoli w/ pansensitivity  - Narrow to levaquin for total of 14 days  - Repeat blood cultures NGTD  - Change fluids to D5W for hypernatremia w/ 40KC  - Cont dilt gtt  - BB increased by cardio  - Replete electrolytes appropriately  - Check Mg  - Cont hager cath   - Consulted urology, suspect possible recurrent septic stones  - Stone seen on CT on admission likely passed given clinical improvement, but urology rec stent placement either was to prevent future infections given multiple infections recently  - Cont acceptable home medications for chronic conditions   - SCDs    I have personally reviewed all pertinent labs and films that have officially resulted over the last 24 hours. I have personally checked for all pending labs that are awaiting final results.     Subjective     Pt s/e @ bedside  Remains in afib, rate slightly better  Feeling better  Denies CP or SOB  Denies abd pain    Objective     Visit Vitals    BP (!) 170/102 (BP 1 Location: Left arm, BP Patient Position: At rest)    Pulse (!) 111    Temp 99.1 °F (37.3 °C)    Resp 20  Ht 6' 2\" (1.88 m)    Wt 120.1 kg (264 lb 12.8 oz)    SpO2 95%    BMI 34 kg/m2       Physical Exam:  General Appearance: NAD, confused  Neck: No JVD, supple  Lungs: CTA with normal respiratory effort  CV: IRIR, no m/r/g  Abdomen: soft, non-tender, obese, normal bowel sounds  Extremities: no cyanosis, moves ext    Intake and Output:  Current Shift:  06/15 0701 - 06/15 1900  In: 546.8 [P.O.:100; I.V.:446.8]  Out: 850 [Urine:850]  Last three shifts:  06/13 1901 - 06/15 0700  In: 6708.1 [P.O.:1180; I.V.:5528.1]  Out: 2900 [Urine:2900]    Lab/Data Reviewed:  CMP:   Lab Results   Component Value Date/Time     (H) 06/15/2017 05:40 AM    K 3.1 (L) 06/15/2017 05:40 AM     (H) 06/15/2017 05:40 AM    CO2 25 06/15/2017 05:40 AM    AGAP 9 06/15/2017 05:40 AM     (H) 06/15/2017 05:40 AM    BUN 29 (H) 06/15/2017 05:40 AM    CREA 1.22 06/15/2017 05:40 AM    GFRAA >60 06/15/2017 05:40 AM    GFRNA 58 (L) 06/15/2017 05:40 AM    CA 7.9 (L) 06/15/2017 05:40 AM    MG 2.3 06/15/2017 05:40 AM     CBC:   Lab Results   Component Value Date/Time    WBC 5.7 06/15/2017 05:40 AM    HGB 10.9 (L) 06/15/2017 05:40 AM    HCT 34.5 (L) 06/15/2017 05:40 AM    PLT 65 (L) 06/15/2017 05:40 AM     All Cardiac Markers in the last 24 hours: No results found for: CPK, CKMMB, CKMB, RCK3, CKMBT, CKNDX, CKND1, PALOMO, TROPT, TROIQ, JUDI, TROPT, TNIPOC, BNP, BNPP    Imaging Reviewed:  No results found.     Medications Reviewed:  Current Facility-Administered Medications   Medication Dose Route Frequency    dextrose 5% with KCl 40 mEq/L infusion   IntraVENous CONTINUOUS    metoprolol tartrate (LOPRESSOR) tablet 50 mg  50 mg Oral Q8H    potassium chloride SR (K-TAB) tablet 40 mEq  40 mEq Oral NOW    insulin lispro (HUMALOG) injection   SubCUTAneous AC&HS    glucose chewable tablet 16 g  16 g Oral PRN    glucagon (GLUCAGEN) injection 1 mg  1 mg IntraMUSCular PRN    dextrose (D50) infusion 12.5-25 g  25-50 mL IntraVENous PRN    aspirin delayed-release tablet 81 mg  81 mg Oral DAILY    docusate sodium (COLACE) capsule 100 mg  100 mg Oral BID    furosemide (LASIX) tablet 40 mg  40 mg Oral DAILY    rosuvastatin (CRESTOR) tablet 5 mg  5 mg Oral QHS    dutasteride (AVODART) capsule 0.5 mg  0.5 mg Oral DAILY    And    tamsulosin (FLOMAX) capsule 0.4 mg  0.4 mg Oral DAILY    dilTIAZem (CARDIZEM) 100 mg in 0.9% sodium chloride (MBP/ADV) 100 mL infusion  10 mg/hr IntraVENous CONTINUOUS    sodium chloride (NS) flush 5-10 mL  5-10 mL IntraVENous PRN    levoFLOXacin (LEVAQUIN) 750 mg in D5W IVPB  750 mg IntraVENous Q24H    enoxaparin (LOVENOX) injection 40 mg  40 mg SubCUTAneous Q24H           Alisa Jensen DO  Internal Medicine, Hospitalist  Pager: [phone]  [address] [place] [address]

## 2017-06-15 NOTE — ANESTHESIA PREPROCEDURE EVALUATION
Anesthetic History   No history of anesthetic complications            Review of Systems / Medical History  Patient summary reviewed and pertinent labs reviewed    Pulmonary  Within defined limits        Undiagnosed apnea         Neuro/Psych         Dementia     Cardiovascular    Hypertension          CAD    Exercise tolerance: >4 METS     GI/Hepatic/Renal  Within defined limits              Endo/Other  Within defined limits           Other Findings   Comments:   Risk Factors for Postoperative nausea/vomiting:       History of postoperative nausea/vomiting? NO       Female? NO       Motion sickness? NO       Intended opioid administration for postoperative analgesia? YES      Smoking Abstinence  Current Smoker? NO  Elective Surgery? YES  Seen preoperatively by anesthesiologist or proxy prior to day of surgery? YES  Pt abstained from smoking 24 hours prior to anesthesia? N/A           Physical Exam    Airway  Mallampati: III  TM Distance: 4 - 6 cm  Neck ROM: normal range of motion   Mouth opening: Normal     Cardiovascular  Regular rate and rhythm,  S1 and S2 normal,  no murmur, click, rub, or gallop  Rhythm: regular  Rate: normal         Dental    Dentition: Poor dentition  Comments: Many teeth broken at the roots   Pulmonary  Breath sounds clear to auscultation               Abdominal  GI exam deferred       Other Findings            Anesthetic Plan    ASA: 3  Anesthesia type: general          Induction: Intravenous  Anesthetic plan and risks discussed with: Parent / Guardian      History and consent obtained from the pt's wife.

## 2017-06-16 NOTE — PROGRESS NOTES
[facility] - Progress Note  Admit Date: 6/12/2017      I saw, evaluated, interviewed and examined the patient personally. I agree with the findings and plan of care as documented below with PAANDRES note  HR over all better  Wean down on IV CCB drip  Continue oral BB. Adding additional dose today. Mark Clinton MD            Assessment:     - Urosepsis   - Nephrolithiasis s/p cystoscopy and left JJ stent placement 06/15/17  - Bacteremia, 2/2 BC with GNR 06/12/17  - Atrial fibrillation with RVR. CHADS2-VASC 4 (+age, +HTN, +CAD)  - Nuclear stress 09/2016 with mid-basal inferolateral ischemia  - Alzheimer Dementia  - CAD: per [facility] report, cardiac cath in Jan 2010 with triple vessel disease. Severe stenosis in RCA, LAD and OM1 branch. No record of CABG. - Anemia  - Hypokalemia  - Hypernatremia  - BREANNE    Plan:     - Will increase next dose of PO Lopressor to 75 mg and reduce Cardizem gtt.  Discontinue if he is maintaining <100   - Poor candidate for long term anticoagulation, continue with ASA    Subjective:     Sleeping, rouses to verbal stimuli    Objective:      Patient Vitals for the past 8 hrs:   Temp Pulse Resp BP SpO2   06/16/17 0938 97.5 °F (36.4 °C) 99 16 154/88 96 %   06/16/17 0650 98.2 °F (36.8 °C) 89 18 136/85 96 %         Patient Vitals for the past 96 hrs:   Weight   06/16/17 0616 261 lb 14.4 oz (118.8 kg)   06/15/17 0600 264 lb 12.8 oz (120.1 kg)   06/14/17 0656 273 lb 4.8 oz (124 kg)   06/14/17 0438 269 lb 4.8 oz (122.2 kg)   06/13/17 0100 269 lb 6.4 oz (122.2 kg)   06/12/17 1506 260 lb 2.3 oz (118 kg)   06/12/17 1500 261 lb (118.4 kg)                    Intake/Output Summary (Last 24 hours) at 06/16/17 1111  Last data filed at 06/16/17 0659   Gross per 24 hour   Intake          2777.74 ml   Output             2225 ml   Net           552.74 ml       Physical Exam:  General:  alert, cooperative, no distress  Neck:  nontender, no JVD  Lungs:  clear to auscultation bilaterally  Heart: irregularly irregular rhythm  Abdomen:  abdomen is soft without significant tenderness, masses, organomegaly or guarding  Extremities:  extremities normal, atraumatic, no cyanosis or edema    Data Review:     Labs: Results:       Chemistry Recent Labs      06/16/17   0440  06/15/17   0540  06/14/17   0510   GLU  200*  196*  205*   NA  148*  150*  152*   K  3.3*  3.1*  3.4*   CL  111*  116*  119*   CO2  29  25  25   BUN  21*  29*  30*   CREA  1.04  1.22  1.38*   CA  7.7*  7.9*  8.1*   MG   --   2.3   --    AGAP  8  9  8   BUCR  20  24*  22*      CBC w/Diff Recent Labs      06/15/17   0540  06/14/17   0510   WBC  5.7  5.6   RBC  3.72*  3.82*   HGB  10.9*  11.1*   HCT  34.5*  35.1*   PLT  65*  52*   GRANS  70  77*   LYMPH  16*  10*   EOS  0  0      Cardiac Enzymes No results found for: CPK, CKMMB, CKMB, RCK3, CKMBT, CKNDX, CKND1, PALOMO, TROPT, TROIQ, JUDI, TROPT, TNIPOC, BNP, BNPP   Coagulation No results for input(s): PTP, INR, APTT in the last 72 hours. No lab exists for component: INREXT    Lipid Panel No results found for: CHOL, CHOLPOCT, CHOLX, CHLST, CHOLV, 775499, HDL, LDL, LDLC, DLDLP, 587971, VLDLC, VLDL, TGLX, TRIGL, TRIGP, TGLPOCT, CHHD, CHHDX   BNP No results found for: BNP, BNPP, XBNPT   Liver Enzymes No results for input(s): TP, ALB, TBIL, AP, SGOT, GPT in the last 72 hours. No lab exists for component: DBIL   Digoxin    Thyroid Studies No results found for: T4, T3U, TSH, TSHEXT       Signed By: Javon Norton     June 16, 2017

## 2017-06-16 NOTE — PROGRESS NOTES
Call placed to [facility], asked to speak to nursing, no answer, VM left for nursing to return my call. Brittany Barajas RN,ext. [phone].

## 2017-06-16 NOTE — PROGRESS NOTES
Problem: Dysphagia (Adult)  Goal: *Acute Goals and Plan of Care (Insert Text)  Recommendations:  Diet: dental soft, thin liquid  Meds: per pt preference  Aspiration Precautions    Goals: Patient will:  1. Tolerate PO trials with 0 s/s overt distress in 4/5 trials  2. Utilize compensatory swallow strategies/maneuvers (decrease bite/sip, size/rate, alt. liq/sol) with min cues in 4/5 trials  Outcome: Progressing Towards Goal  SPEECH LANGUAGE PATHOLOGY BEDSIDE SWALLOW EVALUATION     Patient: Ede Louie (54 y.o. male)  Date: 6/16/2017  Primary Diagnosis: Sepsis ([facility] [address]  UTI (urinary tract infection)  kidney stone  Procedure(s) (LRB):  CYSTOSCOPY LEFT JJ STENT PLACEMENT (Left) 1 Day Post-Op   Precautions: aspiration  Fall, Skin      ASSESSMENT :  Based on the objective data described below, the patient presents with mild oropharyngeal dysphagia. Clinical beside swallow eval completed per MD orders. Pt confused. OM examination revealed oral motor structures functional for mastication and deglutition, mild decreased lingual strength. Presented with thin liquid, puree and solid trials. Exhibited (+) bolus cohesion, manipulation and A-P transit. Further exhibited (+) swallow timing/reflex and functional hyolaryngeal excursion. Pt able to manipulate and clear with 0 clinical s/s aspiration and/or oropharyngeal dysphagia in 15/16 trials; x1 throat clear noted, however do not suspect this is due to aspiration. Pt safe for dental soft solid, thin liquid diet. Patient educated on importance of safe swallowing guidelines (small bites/sips, decreased rate, alt solids/liquids, HOB >60 for all PO intake); needs reinforcement. SLP will follow up x1-2 visits to ensure diet tolerance. Patient will benefit from skilled intervention to address the above impairments.   Patients rehabilitation potential is considered to be Good  Factors which may influence rehabilitation potential include:   [ ]            None noted  [X] Mental ability/status  [ ]            Medical condition  [ ]            Home/family situation and support systems  [ ]            Safety awareness  [ ]            Pain tolerance/management  [ ]            Other:        PLAN :  Recommendations and Planned Interventions:  Dental soft, thin liquids  Frequency/Duration: Patient will be followed by speech-language pathology 1-2 times  to address goals. Discharge Recommendations: Home Health and [address]:   Patient stated I don't remember the last time I had ice cream this cold.       OBJECTIVE:       Past Medical History:   Diagnosis Date    Alzheimer's dementia without behavioral disturbance      BPH (benign prostatic hypertrophy)      CAD (coronary artery disease)      Delirium superimposed on dementia      Elevated PSA      Esophageal reflux      Essential hypertension      Heart disease      Hematuria      Hyperlipidemia      Kidney stone      Lower urinary tract symptoms (LUTS)      Neurogenic bladder      Organic brain syndrome       mild    Prostate disease      Thrombocytopenia ([address]      Vision decreased       Past Surgical History:   Procedure Laterality Date    CARDIAC SURG PROCEDURE UNLIST        HX COLONOSCOPY         x 3.      HX HEART CATHETERIZATION   09/23/2013    [address] and 2013    HX TONSILLECTOMY         Prior Level of Function/Home Situation: SNF  Home Situation  Home Environment: [address] [place] [address] Name: [facility], [place]   One/Two Story Residence: One story  Living Alone: No  Support Systems: Assisted living, Child(mary), Family member(s)  Patient Expects to be Discharged to[de-identified] Skilled nursing facility  Current DME Used/Available at Home: Walker, rolling  Diet prior to admission: unknown  Current Diet:  Dental soft, thin liquids   Cognitive and Communication Status:  Neurologic State: Alert  Orientation Level: Oriented to person  Cognition: Follows commands  Perception: Appears intact  Perseveration: No perseveration noted  Safety/Judgement: Awareness of environment, Decreased insight into deficits, Decreased awareness of need for safety  Oral Assessment:  Oral Assessment  Labial: No impairment  Dentition: Intact  Oral Hygiene: good  Lingual: Decreased rate;Decreased strength  Velum: No impairment  Mandible: No impairment  P.O. Trials:  Patient Position: HOb60  Vocal quality prior to P.O.: No impairment  Consistency Presented: Thin liquid;Puree; Solid  How Presented: SLP-fed/presented;Straw  How Much: 16  Bolus Acceptance: No impairment  Bolus Formation/Control: No impairment     Propulsion: No impairment  Oral Residue: None  Initiation of Swallow: No impairment  Laryngeal Elevation: Functional  Aspiration Signs/Symptoms: Clear throat  Pharyngeal Phase Characteristics: No impairment, issues, or problems   Effective Modifications: Alternate liquids/solids;Small sips and bites  Cues for Modifications: Moderate        Oral Phase Severity: Mild  Pharyngeal Phase Severity : Mild     GCODESwallowing:  Swallow Current Status CI= 1-19%   Swallow Goal Status CI= 1-19%     The severity rating is based on the following outcomes:  PREETHI Noms Swallow               Clinical Judgement     PAIN:  Start of Eval: 0  End of Eval: 0      After treatment:   [ ]            Patient left in no apparent distress sitting up in chair  [X]            Patient left in no apparent distress in bed  [X]            Call bell left within reach  [X]            Nursing notified  [ ]            Family present  [ ]            Caregiver present  [ ]            Bed alarm activated      COMMUNICATION/EDUCATION:   [X]            Aspiration precautions; swallow safety; compensatory techniques. [ ]            Patient/family have participated as able in goal setting and plan of care. [ ]            Patient/family agree to work toward stated goals and plan of care.   [ ]            Patient understands intent and goals of therapy; neutral about participation. [X]            Patient unable to participate in goal setting/plan of care; educ ongoing with interdisciplinary staff  [ ]             Posted safety precautions in patient's room.      Thank you for this referral.  Edd Levin  [facility] SLP  Time Calculation: 45 mins

## 2017-06-16 NOTE — PROGRESS NOTES
Problem: Mobility Impaired (Adult and Pediatric)  Goal: *Acute Goals and Plan of Care (Insert Text)  Physical Therapy Goals  Initiated 6/14/2017 and to be accomplished within 7 day(s)  1. Patient will move from supine to sit and sit to supine , scoot up and down and roll side to side in bed with supervision/set-up. 2. Patient will transfer from bed to chair and chair to bed with minimal assistance/contact guard assist using the least restrictive device. 3. Patient will perform sit to stand with minimal assistance/contact guard assist.  4. Patient will ambulate with minimal assistance/contact guard assist for 75 feet with the least restrictive device. Outcome: Progressing Towards Goal  PHYSICAL THERAPY TREATMENT     Patient: Ede Louie (12 y.o. male)  Date: 6/16/2017  Diagnosis: Sepsis ([address]  UTI (urinary tract infection)  kidney stone Severe sepsis (HCC)  Procedure(s) (LRB):  CYSTOSCOPY LEFT JJ STENT PLACEMENT (Left) 1 Day Post-Op  Precautions: Fall, Skin   Chart, physical therapy assessment, plan of care and goals were reviewed. ASSESSMENT:  Pt found spine in bed willing to work with PT. Pt declines sitting at EOb or standing despite mutiple attempts of encouragement. Pt performed therex supine requiring several VCs to stay on task. Pt currently only oriented to self. Needs left in reach. Education: therex. Progression toward goals:  [ ]      Improving appropriately and progressing toward goals  [X]      Improving slowly and progressing toward goals  [ ]      Not making progress toward goals and plan of care will be adjusted       PLAN:  Patient continues to benefit from skilled intervention to address the above impairments. Continue treatment per established plan of care. Discharge Recommendations:  Rehab / SNF  Further Equipment Recommendations for Discharge:  rolling walker       SUBJECTIVE:   Patient stated I'd rather not everything is pinching me.       OBJECTIVE DATA SUMMARY: Critical Behavior:  Neurologic State: Alert  Orientation Level: Oriented to person  Cognition: Follows commands  Safety/Judgement: Awareness of environment, Decreased insight into deficits, Decreased awareness of need for safety  Therapeutic Exercises:   Bilaterally:    EXERCISE   Sets   Reps   Active Active Assist   Passive Self ROM   Comments   Ankle Pumps 1 10  [X] [ ] [ ] [ ]     Quad Sets 1 10 [X] [ ] [ ] [ ]     Hamstring Sets     [ ] [ ] [ ] [ ]     Patricia Brazil     [ ] [ ] [ ] [ ]     Heel Slides 1 8 [ ] [ ] [ ] [ ]     Ilan Sajosephr     [ ] [ ] [ ] [ ]     Hip Abd/Add 1 8 [ ] [ ] [ ] [ ]     Alfredito Birmingham     [ ] [ ] [ ] [ ]     Cliff Aparicio     [ ] [ ] [ ] [ ]     Hannah Kc     [ ] [ ] [ ] [ ]           [ ] [ ] [ ] [ ]        Pain:  Pre tx pain: 0  Post tx pain: 0  Pain Scale 1: Numeric (0 - 10)  Pain Intensity 1:  (not rated)  Activity Tolerance:   fair  Please refer to the flowsheet for vital signs taken during this treatment.   After treatment:   [ ] Patient left in no apparent distress sitting up in chair  [X] Patient left in no apparent distress in bed  [X] Call bell left within reach  [ ] Nursing notified  [ ] Caregiver present  [ ] Bed alarm activated      Rosey Negrete PTA   Time Calculation: 9 mins

## 2017-06-16 NOTE — PROGRESS NOTES
Urology Progress Note        Assessment/Plan:     Principal Problem:    Severe sepsis ([address] (2017)    Active Problems:    UTI (urinary tract infection) (2017)      Bacteremia (2017)      Essential hypertension (2017)      BPH (benign prostatic hypertrophy) (2017)      Hyperlipidemia (2017)      Alzheimer's dementia without behavioral disturbance (2017)      CAD (coronary artery disease) (2017)      Atrial fibrillation with rapid ventricular response ([address] (2017)      Hypokalemia (2017)      Acute kidney failure ([address] (2017)      Hypernatremia (2017)      Obesity (BMI 30.0-34.9) (2017)      Acute metabolic encephalopathy (7704)      Lactic acidosis (2017)      Thrombocytopenia ([address] (2017)      Recurrent nephrolithiasis (6/15/2017)        Status Post:  Procedure(s):  CYSTOSCOPY LEFT JJ STENT PLACEMENT 6/15/17    Plan:    Cont Levaquin per cx results  Definitive stone tx at later date  VT as improves    Subjective:     Daily Progress Note: 2017 8:38 AM    Alicia Jeffers is 1 Day Post-Op and doing adequate. He reports pain is well controlled. He has no complaints. Indwelling catheter is draining well. Objective:     Visit Vitals    /85 (BP 1 Location: Left arm)    Pulse 89    Temp 98.2 °F (36.8 °C)    Resp 18    Ht 6' 2\" (1.88 m)    Wt 261 lb 14.4 oz (118.8 kg)    SpO2 96%    BMI 33.63 kg/m2        Temp (24hrs), Av.6 °F (37 °C), Min:97.9 °F (36.6 °C), Max:99.1 °F (37.3 °C)      Intake and Output:   1901 -  0700  In: 3020.5 [P.O.:340; I.V.:2680.5]  Out: 2625 [Urine:2625]       Physical Exam:   General appearance: alert, cooperative, no distress, appears stated age  Abdomen: soft, non-tender.  No masses,  no organomegaly    [place] with light pink urine    Data Review:    Recent Results (from the past 24 hour(s))   GLUCOSE, POC    Collection Time: 06/15/17  8:39 AM   Result Value Ref Range    Glucose (POC) 228 (H) 70 - 110 mg/dL   GLUCOSE, POC    Collection Time: 06/15/17  8:49 AM   Result Value Ref Range    Glucose (POC) 223 (H) 70 - 110 mg/dL   GLUCOSE, POC    Collection Time: 06/15/17 12:37 PM   Result Value Ref Range    Glucose (POC) 197 (H) 70 - 110 mg/dL   GLUCOSE, POC    Collection Time: 06/15/17 11:16 PM   Result Value Ref Range    Glucose (POC) 186 (H) 70 - 946 mg/dL   METABOLIC PANEL, BASIC    Collection Time: 06/16/17  4:40 AM   Result Value Ref Range    Sodium 148 (H) 136 - 145 mmol/L    Potassium 3.3 (L) 3.5 - 5.5 mmol/L    Chloride 111 (H) 100 - 108 mmol/L    CO2 29 21 - 32 mmol/L    Anion gap 8 3.0 - 18 mmol/L    Glucose 200 (H) 74 - 99 mg/dL    BUN 21 (H) 7.0 - 18 MG/DL    Creatinine 1.04 0.6 - 1.3 MG/DL    BUN/Creatinine ratio 20 12 - 20      GFR est AA >60 >60 ml/min/1.73m2    GFR est non-AA >60 >60 ml/min/1.73m2    Calcium 7.7 (L) 8.5 - 10.1 MG/DL   GLUCOSE, POC    Collection Time: 06/16/17  7:52 AM   Result Value Ref Range    Glucose (POC) 212 (H) 70 - 110 mg/dL         Signed By:     Neymar Herrera MD   Urologic Oncologist  [facility] and Mariela Rosa MD Professorship  Professor of Urology  [facility]  Office [phone] Ext [phone]                          June 16, 2017

## 2017-06-16 NOTE — OP NOTES
Ernie Martinez    Name:  Carmen Snyder  MR#:  [id number]  :  1940  Account #:  [de-identified]  Date of Adm:  2017  Date of Surgery:  06/15/2017      PREOPERATIVE DIAGNOSES  1. Left distal ureteral calculus. 2. Acute kidney injury. 3. Sepsis secondary to urinary tract infection. POSTOPERATIVE DIAGNOSES  1. Left distal ureteral calculus. 2. Acute kidney injury. 3. Sepsis secondary to urinary tract infection. PROCEDURES PERFORMED:    1. Cystourethroscopy  2. Left retrograde pyelogram  3. Left Double-J stent placement  4. Interpretation of fluoroscopy of less than 15 minutes. SURGEON:  Jordyn Brtio M.D.    ESTIMATED BLOOD LOSS:  Minimal.    SPECIMENS REMOVED:  None. ANESTHESIA:  General.    COMPLICATIONS:  None. CULTURES:  Bladder urine for culture. DRAINS  1. Left 6 x 26 Double-J stent. 2. 16-Thai Archuleta. INDICATIONS FOR PROCEDURE:  The patient is a 79-year-old male  who was admitted to the hospital three days ago with a urinary tract  infection leading to sepsis. He is well known to our practice and  followed for recurrent urinary tract infections. At that time, he had a CT  scan which demonstrated a distal left ureteral calculus, 5 mm in size,  associated with mild hydronephrosis. We were consulted today for  management of his urinary infection and felt that it would be prudent to  place a left-sided stent given the fact that the patient was septic. Although he was improving, and the CAT scan was done three days  ago, it was better not to take any chances and allow for maximum  alleviation of the obstruction. INFORMED CONSENT:  I discussed all risks, benefits, and  Alternatives with the patient and his wife. All questions were answered. I also explained to her  that there is a possibility that the left stone has passed.   However, it  would be still in the patient's advantage to undergo the left stent  placement as he does have left renal calculi as well. DESCRIPTION OF PROCEDURE:  After obtaining informed consent  from the patient's wife, he was brought back to the operating table and  placed in the supine position, where general anesthesia was induced. He was repositioned into the dorsal lithotomy position with all pressure  points padded and secured. SCDs were on and in place. He was  already receiving IV antibiotics on the medical floor. He was then  prepped and draped in the usual sterile fashion, and a timeout was  performed per the hospital policy. Using a 21-Stateless cystoscope, I  easily cannulated the urethra, entering into the bladder. I noted an  obstructing prostate. Upon entry into the bladder, there was quite a bit  of sediment and stone debris, This was flushed  out. The bladder was extremely inflamed, likely secondary to the Archuleta  as well as the cystitis that the patient has. He also had 4+  trabeculations and there were two small diverticula that were  surveyed. There were no bladder lesions. Both ureteral orifices were  in their normal orthotopic positions. A  film was shot,  demonstrating phleboliths bilaterally and a stone which appeared to be  coursing in the path of the ureter. A left-sided Glidewire was placed,  and it demonstrated that this calcification likely was within the distal  ureter and corresponded to the previous CT scan. A ureteral catheter  was passed up to the level of the kidney, and a retrograde pyelogram  was shot, demonstrating minimal to no hydronephrosis. A sensor wire  was placed, and a 6 x 26 Double-J stent was placed over this,  confirming it to be in good position fluoroscopically within the kidney  and cystoscopically within the bladder. The scope was withdrawn. A 16-Stateless Archuleta catheter was inserted. The patient was cleaned and reversed from anesthesia. The patient  was transferred to the stretcher and PACU in satisfactory condition.     Of note, upon entry into the bladder, I did take send the bladder urine for  culture. I was present and scrubbed for the entirety of this case. Plan:  See brief op note for plan.         Jean Marie Hayes MD    MS / 1970 [address]  D:  06/15/2017   17:24  T:  06/16/2017   00:32  Job #:  [id number]

## 2017-06-16 NOTE — PROGRESS NOTES
NUTRITION follow-up/Plan of care    RECOMMENDATIONS:     1. Resume diet when medically indicated  2. Monitor weight, labs and PO intake  3. RD to follow    GOALS:     1. Ongoing: PO intake meets >75% of protein/calorie needs by 6/21  2. Ongoing: Maintain weight (+/- 1-2 lb by 6/21)    ASSESSMENT:     Weight status is classified as obese per BMI of 33.6. Currently not meeting nutrition needs due to NPO diet order. Labs noted. BG range from 186-261 over past 24 hours. Patient with hypernatremia and hypokalemia. Nutrition recommendations listed. RD to follow. Nutrition Risk:  []   High [x]  Moderate [] Low    SUBJECTIVE/OBJECTIVE:     Transferred from ICU. Patient admitted with sepsis. S/P  Cystourethroscopy, Left retrograde pyelogram, Left Double-J stent placement on 6/15. Currently with NPO diet order. Reviewed SLP recommendations for dental soft diet and thin liquids. Will monitor diet advancement. Information Obtained From:   [x] Chart Review  [] Patient  [] Family/Caregiver  [] Nurse/Physician   [] Patient Rounds/Interdisciplinary Meeting    Diet: NPO  Patient Vitals for the past 100 hrs:   % Diet Eaten   06/14/17 1413 80 %     Medications: [x] Reviewed   Encounter Diagnoses     ICD-10-CM ICD-9-CM   1. Sepsis, due to unspecified organism ([facility] [address] A41.9 038.9     995.91   2. Pyelonephritis N12 590.80   3. Fever, unspecified fever cause R50.9 780.60   4. Tachycardia R00.0 785.0   5.  Atrial fibrillation, unspecified type ([facility] [address] I48.91 427.31     Past Medical History:   Diagnosis Date    Alzheimer's dementia without behavioral disturbance     BPH (benign prostatic hypertrophy)     CAD (coronary artery disease)     Delirium superimposed on dementia     Elevated PSA     Esophageal reflux     Essential hypertension     Heart disease     Hematuria     Hyperlipidemia     Kidney stone     Lower urinary tract symptoms (LUTS)     Neurogenic bladder     Organic brain syndrome     mild    Prostate disease  Thrombocytopenia ([facility] [address]     Vision decreased      Labs:  Lab Results   Component Value Date/Time    Sodium 148 06/16/2017 04:40 AM    Potassium 3.3 06/16/2017 04:40 AM    Chloride 111 06/16/2017 04:40 AM    CO2 29 06/16/2017 04:40 AM    Anion gap 8 06/16/2017 04:40 AM    Glucose 200 06/16/2017 04:40 AM    BUN 21 06/16/2017 04:40 AM    Creatinine 1.04 06/16/2017 04:40 AM    Calcium 7.7 06/16/2017 04:40 AM    Magnesium 2.3 06/15/2017 05:40 AM    Phosphorus 2.8 06/13/2017 10:55 AM    Albumin 2.9 06/12/2017 03:30 PM     Anthropometrics: BMI (calculated): 33.6 Last 3 Recorded Weights in this Encounter    06/14/17 0656 06/15/17 0600 06/16/17 0616   Weight: 124 kg (273 lb 4.8 oz) 120.1 kg (264 lb 12.8 oz) 118.8 kg (261 lb 14.4 oz)    Ht Readings from Last 1 Encounters:   06/13/17 6' 2\" (1.88 m)     [x]  Weight Loss  []  Weight Gain  []  Weight Stable   []  New wt n/a on record     Estimated Nutrition Needs:   1218 Kcals/day  Protein (g): 103 g    Nutrition Problems Identified:   [x] Suboptimal PO intake   [] Food Allergies  [x] Difficulty chewing/swallowing/poor dentition  [] Constipation/Diarrhea   [] Nausea/Vomiting   [] None  [] Other:     Plan:   [] Therapeutic Diet  []  Obtained/adjusted food preferences/tolerances and/or snacks options   []  Supplements added   [x]  Speech therapy following for feeding techniques  []  HS snack added   []  Modify diet texture   []  Modify diet for food allergies   []  Educate patient   []  Assist with menu selection   [x]  Monitor PO intake on meal rounds (whend diet resumes)  [x]  Continue inpatient monitoring and intervention   []  Participated in discharge planning/Interdisciplinary rounds/Team meetings   []  Other:     Education Needs:   [x] Not appropriate for teaching at this time due to: NPO   [] Identified and addressed    Nutrition Monitoring and Evaluation:   [x] Continue inpatient monitoring and interventions    [] Other:     Gwendolynn Headings

## 2017-06-16 NOTE — PROGRESS NOTES
Internal Medicine Progress Note    Patient's Name: Radha Murray  Admit Date: 6/12/2017  Length of Stay: 4      Assessment/Plan     Active Hospital Problems    Diagnosis Date Noted    Recurrent nephrolithiasis 06/15/2017    Acute metabolic encephalopathy 30/03/7376    Lactic acidosis 06/14/2017    Thrombocytopenia ([address] 06/14/2017    Bacteremia 06/13/2017    Essential hypertension 06/13/2017    BPH (benign prostatic hypertrophy) 06/13/2017    Hyperlipidemia 06/13/2017    Alzheimer's dementia without behavioral disturbance 06/13/2017    CAD (coronary artery disease) 06/13/2017    Atrial fibrillation with rapid ventricular response ([address] 06/13/2017    Hypokalemia 06/13/2017    Acute kidney failure ([address] 06/13/2017    Hypernatremia 06/13/2017    Obesity (BMI 30.0-34.9) 06/13/2017    UTI (urinary tract infection) 06/12/2017    Severe sepsis ([address] 06/12/2017     - Cont levaquin (day 4/14)  - Repeat blood cultures NGTD  - Cont D5W w/ 40Kcl for electrolyte abn  - BB increased   - Attempt to titrate off dilt gtt  - Cont hager cath   - s/p L JJ stent  - Appreciate urology recs  - Cont acceptable home medications for chronic conditions   - SCDs    I have personally reviewed all pertinent labs and films that have officially resulted over the last 24 hours. I have personally checked for all pending labs that are awaiting final results.     Subjective     Pt s/e @ bedside  Remains in afib, dilt down to 5  Denies CP or SOB  Denies abd pain    Objective     Visit Vitals    /80    Pulse 70    Temp 97.8 °F (36.6 °C)    Resp 16    Ht 6' 2\" (1.88 m)    Wt 118.8 kg (261 lb 14.4 oz)    SpO2 97%    BMI 33.63 kg/m2       Physical Exam:  General Appearance: NAD, AA&Ox1  Neck: No JVD, supple  Lungs: CTA with normal respiratory effort  CV: IRIR, no m/r/g  Abdomen: soft, non-tender, obese, normal bowel sounds  Extremities: no cyanosis, moves ext    Intake and Output:  Current Shift:  06/16 0701 - 06/16 1900  In: 100 [P.O.:100]  Out: -   Last three shifts:  06/14 1901 - 06/16 0700  In: 5058.8 [P.O.:1060; I.V.:3998.8]  Out: 2625 [Urine:2625]    Lab/Data Reviewed:  CMP:   Lab Results   Component Value Date/Time     (H) 06/16/2017 04:40 AM    K 3.3 (L) 06/16/2017 04:40 AM     (H) 06/16/2017 04:40 AM    CO2 29 06/16/2017 04:40 AM    AGAP 8 06/16/2017 04:40 AM     (H) 06/16/2017 04:40 AM    BUN 21 (H) 06/16/2017 04:40 AM    CREA 1.04 06/16/2017 04:40 AM    GFRAA >60 06/16/2017 04:40 AM    GFRNA >60 06/16/2017 04:40 AM    CA 7.7 (L) 06/16/2017 04:40 AM     CBC:   No results found for: WBC, HGB, HGBEXT, HCT, HCTEXT, PLT, PLTEXT, HGBEXT, HCTEXT, PLTEXT  All Cardiac Markers in the last 24 hours: No results found for: CPK, CKMMB, CKMB, RCK3, CKMBT, CKNDX, CKND1, PALOMO, TROPT, TROIQ, JUDI, TROPT, TNIPOC, BNP, BNPP    Imaging Reviewed:  No results found.     Medications Reviewed:  Current Facility-Administered Medications   Medication Dose Route Frequency    metoprolol tartrate (LOPRESSOR) tablet 75 mg  75 mg Oral Q8H    [START ON 6/17/2017] metoprolol tartrate (LOPRESSOR) tablet 50 mg  50 mg Oral Q8H    dextrose 5% with KCl 40 mEq/L infusion   IntraVENous CONTINUOUS    insulin lispro (HUMALOG) injection   SubCUTAneous AC&HS    glucose chewable tablet 16 g  16 g Oral PRN    glucagon (GLUCAGEN) injection 1 mg  1 mg IntraMUSCular PRN    dextrose (D50) infusion 12.5-25 g  25-50 mL IntraVENous PRN    aspirin delayed-release tablet 81 mg  81 mg Oral DAILY    docusate sodium (COLACE) capsule 100 mg  100 mg Oral BID    furosemide (LASIX) tablet 40 mg  40 mg Oral DAILY    rosuvastatin (CRESTOR) tablet 5 mg  5 mg Oral QHS    dutasteride (AVODART) capsule 0.5 mg  0.5 mg Oral DAILY    And    tamsulosin (FLOMAX) capsule 0.4 mg  0.4 mg Oral DAILY    dilTIAZem (CARDIZEM) 100 mg in 0.9% sodium chloride (MBP/ADV) 100 mL infusion  5 mg/hr IntraVENous CONTINUOUS    sodium chloride (NS) flush 5-10 mL  5-10 mL IntraVENous PRN    levoFLOXacin (LEVAQUIN) 750 mg in D5W IVPB  750 mg IntraVENous Q24H    enoxaparin (LOVENOX) injection 40 mg  40 mg SubCUTAneous Q24H           Galen Jackson DO  Internal Medicine, Hospitalist  Pager: [facility]

## 2017-06-16 NOTE — PROGRESS NOTES
Received call back from [facility], spoke with Jaxson Lopez, states ok for pt to return to [facility] with alley. Will need home health order for Archuleta care & assessment, states pt already active with [facility] for P.T. States pt is on memory care unit. Will cont to follow. Brittany BaarjasRN,ext. [phone].

## 2017-06-16 NOTE — PROGRESS NOTES
Chart reviewed. Pt lives at [facility]; will need 24 business hrs notice prior to discharge, as pt will have to be assessed by correction for ability to return, thanks. If pt will require hager would likely need SNF & alternate living arrangement. Will check with correction to see if pt can return with hager, in the past pt's were not allowed with hager. Will cont to follow for further needs. Brittany Barajas RN,ext [phone].

## 2017-06-17 NOTE — PROGRESS NOTES
[facility] - Progress Note  Admit Date: 6/12/2017      I saw, evaluated, interviewed and examined the patient personally. I agree with the findings and plan of care as documented below with PATieshaC note  HR over all better  Wean down on IV CCB drip  Continue oral BB. Adding additional dose today. Sis Covarrubias MD            Assessment:     - Urosepsis   - Nephrolithiasis s/p cystoscopy and left JJ stent placement 06/15/17  - Bacteremia, 2/2 BC with GNR 06/12/17  - Atrial fibrillation with RVR. CHADS2-VASC 4 (+age, +HTN, +CAD)  - Nuclear stress 09/2016 with mid-basal inferolateral ischemia  - Alzheimer Dementia  - CAD: per [facility] report, cardiac cath in Jan 2010 with triple vessel disease. Severe stenosis in RCA, LAD and OM1 branch. No record of CABG. - Anemia  - Hypokalemia  - Hypernatremia  - BREANNE    Plan:     - Continue metoprolol 50 mg Every 8 hours for now. Will need to change equivalent BID dosing on discharge ( 75 mg bid)  - Currently appears suboptimal candidate for anticoagulation. Continue  mg daily. Recommend PT/OT input to decide fall risk and bleeding risk if on anticoagulation.      Subjective:     No CP r palpitations      Objective:      Patient Vitals for the past 8 hrs:   Temp Pulse Resp BP SpO2   06/17/17 0552 97.4 °F (36.3 °C) 80 18 143/88 96 %   06/17/17 0139 97.7 °F (36.5 °C) 95 18 150/89 98 %         Patient Vitals for the past 96 hrs:   Weight   06/17/17 0552 265 lb (120.2 kg)   06/16/17 0616 261 lb 14.4 oz (118.8 kg)   06/15/17 0600 264 lb 12.8 oz (120.1 kg)   06/14/17 0656 273 lb 4.8 oz (124 kg)   06/14/17 0438 269 lb 4.8 oz (122.2 kg)                    Intake/Output Summary (Last 24 hours) at 06/17/17 0915  Last data filed at 06/17/17 0659   Gross per 24 hour   Intake          3448.33 ml   Output             2625 ml   Net           823.33 ml       Physical Exam:  General:  alert, cooperative, no distress  Neck:  nontender, no JVD  Lungs:  No significant rales  Heart:  irregularly irregular rhythm  Abdomen:  abdomen is soft without significant tenderness, masses, organomegaly or guarding  Extremities: no significant edema  Data Review:     Labs: Results:       Chemistry Recent Labs      06/17/17 0440 06/16/17 0440  06/15/17   0540   GLU  172*  200*  196*   NA  144  148*  150*   K  3.8  3.3*  3.1*   CL  109*  111*  116*   CO2  25  29  25   BUN  23*  21*  29*   CREA  0.93  1.04  1.22   CA  7.8*  7.7*  7.9*   MG   --   2.1  2.3   AGAP  10  8  9   BUCR  25*  20  24*      CBC w/Diff Recent Labs      06/17/17   0440  06/15/17   0540   WBC  7.2  5.7   RBC  3.88*  3.72*   HGB  11.2*  10.9*   HCT  35.5*  34.5*   PLT  88*  65*   GRANS  69  70   LYMPH  18*  16*   EOS  1  0      Cardiac Enzymes No results found for: CPK, CKMMB, CKMB, RCK3, CKMBT, CKNDX, CKND1, PALOMO, TROPT, TROIQ, JUDI, TROPT, TNIPOC, BNP, BNPP   Coagulation No results for input(s): PTP, INR, APTT in the last 72 hours. No lab exists for component: INREXT, INREXT    Lipid Panel No results found for: CHOL, CHOLPOCT, CHOLX, CHLST, CHOLV, 516441, HDL, LDL, LDLC, DLDLP, 402530, VLDLC, VLDL, TGLX, TRIGL, TRIGP, TGLPOCT, CHHD, CHHDX   BNP No results found for: BNP, BNPP, XBNPT   Liver Enzymes No results for input(s): TP, ALB, TBIL, AP, SGOT, GPT in the last 72 hours.     No lab exists for component: DBIL   Digoxin    Thyroid Studies No results found for: T4, T3U, TSH, TSHEXT, TSHEXT       Signed By: Rubina Olea MD     June 17, 2017

## 2017-06-17 NOTE — PROGRESS NOTES
Internal Medicine Progress Note    Patient's Name: Baljit Cabrera  Admit Date: 6/12/2017  Length of Stay: 5      Assessment/Plan     Active Hospital Problems    Diagnosis Date Noted    Recurrent nephrolithiasis 06/15/2017    Acute metabolic encephalopathy 12/58/8565    Lactic acidosis 06/14/2017    Thrombocytopenia ([facility] [address] 06/14/2017    Bacteremia 06/13/2017    Essential hypertension 06/13/2017    BPH (benign prostatic hypertrophy) 06/13/2017    Hyperlipidemia 06/13/2017    Alzheimer's dementia without behavioral disturbance 06/13/2017    CAD (coronary artery disease) 06/13/2017    Atrial fibrillation with rapid ventricular response (HCC) 06/13/2017    Hypokalemia 06/13/2017    Acute kidney failure ([facility] [address] 06/13/2017    Hypernatremia 06/13/2017    Obesity (BMI 30.0-34.9) 06/13/2017    UTI (urinary tract infection) 06/12/2017    Severe sepsis (HCC) 06/12/2017     - Cont levaquin (day 5/14)  - Repeat blood cultures NGTD  - Cont free water w/ KCl x 1 more day  - BB increased per cardio  - Likely to get off dilt gtt by tonight  - D/c hager and start void trial   - s/p L JJ stent  - Appreciate urology recs  - Cont acceptable home medications for chronic conditions   - SCDs    I have personally reviewed all pertinent labs and films that have officially resulted over the last 24 hours. I have personally checked for all pending labs that are awaiting final results.     Subjective     Pt s/e @ bedside  Doing OK  Denies CP or SOB  Denies abd pain    Objective     Visit Vitals    /85    Pulse 99    Temp 97.3 °F (36.3 °C)    Resp 20    Ht 6' 2\" (1.88 m)    Wt 120.2 kg (265 lb)    SpO2 98%    BMI 34.02 kg/m2       Physical Exam:  General Appearance: NAD, AA&Ox1  Neck: No JVD, supple  Lungs: CTA with normal respiratory effort  CV: IRIR, no m/r/g  Abdomen: soft, non-tender, obese, normal bowel sounds  Extremities: no cyanosis, moves ext    Intake and Output:  Current Shift:  06/17 0701 - 06/17 1900  In: 120 [P.O.:120]  Out: 2050 [Urine:2050]  Last three shifts:  06/15 1901 - 06/17 0700  In: 5636.7 [P.O.:1620; I.V.:4016.7]  Out: 4000 [Urine:4000]    Lab/Data Reviewed:  CMP:   Lab Results   Component Value Date/Time     06/17/2017 04:40 AM    K 3.8 06/17/2017 04:40 AM     (H) 06/17/2017 04:40 AM    CO2 25 06/17/2017 04:40 AM    AGAP 10 06/17/2017 04:40 AM     (H) 06/17/2017 04:40 AM    BUN 23 (H) 06/17/2017 04:40 AM    CREA 0.93 06/17/2017 04:40 AM    GFRAA >60 06/17/2017 04:40 AM    GFRNA >60 06/17/2017 04:40 AM    CA 7.8 (L) 06/17/2017 04:40 AM     CBC:   Lab Results   Component Value Date/Time    WBC 7.2 06/17/2017 04:40 AM    HGB 11.2 (L) 06/17/2017 04:40 AM    HCT 35.5 (L) 06/17/2017 04:40 AM    PLT 88 (L) 06/17/2017 04:40 AM     All Cardiac Markers in the last 24 hours: No results found for: CPK, CKMMB, CKMB, RCK3, CKMBT, CKNDX, CKND1, PALOMO, TROPT, TROIQ, JUDI, TROPT, TNIPOC, BNP, BNPP    Imaging Reviewed:  No results found.     Medications Reviewed:  Current Facility-Administered Medications   Medication Dose Route Frequency    [START ON 6/18/2017] aspirin delayed-release tablet 162 mg  162 mg Oral DAILY    levoFLOXacin (LEVAQUIN) tablet 750 mg  750 mg Oral Q24H    metoprolol tartrate (LOPRESSOR) tablet 50 mg  50 mg Oral Q8H    dextrose 5% with KCl 40 mEq/L infusion   IntraVENous CONTINUOUS    insulin lispro (HUMALOG) injection   SubCUTAneous AC&HS    glucose chewable tablet 16 g  16 g Oral PRN    glucagon (GLUCAGEN) injection 1 mg  1 mg IntraMUSCular PRN    dextrose (D50) infusion 12.5-25 g  25-50 mL IntraVENous PRN    docusate sodium (COLACE) capsule 100 mg  100 mg Oral BID    furosemide (LASIX) tablet 40 mg  40 mg Oral DAILY    rosuvastatin (CRESTOR) tablet 5 mg  5 mg Oral QHS    dutasteride (AVODART) capsule 0.5 mg  0.5 mg Oral DAILY    And    tamsulosin (FLOMAX) capsule 0.4 mg  0.4 mg Oral DAILY    sodium chloride (NS) flush 5-10 mL  5-10 mL IntraVENous PRN    enoxaparin (LOVENOX) injection 40 mg  40 mg SubCUTAneous Q24H           Bhavin Claire DO  Internal Medicine, Hospitalist  Pager: [phone]  [address] [place] [address]

## 2017-06-17 NOTE — PROGRESS NOTES
Urology Progress Note        Assessment/Plan:     Principal Problem:    Severe sepsis ([address] (2017)    Active Problems:    UTI (urinary tract infection) (2017)      Bacteremia (2017)      Essential hypertension (2017)      BPH (benign prostatic hypertrophy) (2017)      Hyperlipidemia (2017)      Alzheimer's dementia without behavioral disturbance (2017)      CAD (coronary artery disease) (2017)      Atrial fibrillation with rapid ventricular response ([address] (2017)      Hypokalemia (2017)      Acute kidney failure ([address] (2017)      Hypernatremia (2017)      Obesity (BMI 30.0-34.9) (2017)      Acute metabolic encephalopathy (4445)      Lactic acidosis (2017)      Thrombocytopenia ([address] (2017)      Recurrent nephrolithiasis (6/15/2017)        Status Post:  Procedure(s):  CYSTOSCOPY LEFT JJ STENT PLACEMENT 6/15/17    Plan:    Continue Levaquin  Definitive stone treatment in future  Voiding trial prior to going home  Will see again Monday    Subjective:     Daily Progress Note: 2017 8:38 AM    Melnida Oneill is 2 Days Post-Op and doing satisfactory. He reports pain is absent. He has no complaints. He is tolerating a solid diet and unable to get out of bed. Indwelling catheter is draining well. Objective:     Visit Vitals    /88 (BP 1 Location: Right arm, BP Patient Position: At rest)    Pulse 80    Temp 97.4 °F (36.3 °C)    Resp 18    Ht 6' 2\" (1.88 m)    Wt 265 lb (120.2 kg)    SpO2 96%    BMI 34.02 kg/m2        Temp (24hrs), Av.6 °F (36.4 °C), Min:97.4 °F (36.3 °C), Max:97.8 °F (36.6 °C)      Intake and Output:  06/15 1901 -  0700  In: 5636.7 [P.O.:1620; I.V.:4016.7]  Out: 4000 [Urine:4000]       Physical Exam:   General appearance: fatigued, no distress, appears stated age  Abdomen: soft, non-tender.  . No masses,  no organomegaly    [place] with clear urine    Data Review:    Recent Results (from the past 24 hour(s))   GLUCOSE, POC    Collection Time: 06/16/17 11:20 AM   Result Value Ref Range    Glucose (POC) 261 (H) 70 - 110 mg/dL   GLUCOSE, POC    Collection Time: 06/16/17  4:11 PM   Result Value Ref Range    Glucose (POC) 141 (H) 70 - 110 mg/dL   GLUCOSE, POC    Collection Time: 06/16/17  9:53 PM   Result Value Ref Range    Glucose (POC) 138 (H) 70 - 110 mg/dL   CBC WITH AUTOMATED DIFF    Collection Time: 06/17/17  4:40 AM   Result Value Ref Range    WBC 7.2 4.6 - 13.2 K/uL    RBC 3.88 (L) 4.70 - 5.50 M/uL    HGB 11.2 (L) 13.0 - 16.0 g/dL    HCT 35.5 (L) 36.0 - 48.0 %    MCV 91.5 74.0 - 97.0 FL    MCH 28.9 24.0 - 34.0 PG    MCHC 31.5 31.0 - 37.0 g/dL    RDW 15.2 (H) 11.6 - 14.5 %    PLATELET 88 (L) 729 - 420 K/uL    MPV 10.6 9.2 - 11.8 FL    NEUTROPHILS 69 40 - 73 %    LYMPHOCYTES 18 (L) 21 - 52 %    MONOCYTES 12 (H) 3 - 10 %    EOSINOPHILS 1 0 - 5 %    BASOPHILS 0 0 - 2 %    ABS. NEUTROPHILS 4.9 1.8 - 8.0 K/UL    ABS. LYMPHOCYTES 1.3 0.9 - 3.6 K/UL    ABS. MONOCYTES 0.9 0.05 - 1.2 K/UL    ABS. EOSINOPHILS 0.1 0.0 - 0.4 K/UL    ABS.  BASOPHILS 0.0 0.0 - 0.06 K/UL    DF AUTOMATED     METABOLIC PANEL, BASIC    Collection Time: 06/17/17  4:40 AM   Result Value Ref Range    Sodium 144 136 - 145 mmol/L    Potassium 3.8 3.5 - 5.5 mmol/L    Chloride 109 (H) 100 - 108 mmol/L    CO2 25 21 - 32 mmol/L    Anion gap 10 3.0 - 18 mmol/L    Glucose 172 (H) 74 - 99 mg/dL    BUN 23 (H) 7.0 - 18 MG/DL    Creatinine 0.93 0.6 - 1.3 MG/DL    BUN/Creatinine ratio 25 (H) 12 - 20      GFR est AA >60 >60 ml/min/1.73m2    GFR est non-AA >60 >60 ml/min/1.73m2    Calcium 7.8 (L) 8.5 - 10.1 MG/DL   GLUCOSE, POC    Collection Time: 06/17/17  7:56 AM   Result Value Ref Range    Glucose (POC) 202 (H) 70 - 110 mg/dL         Signed By:     Tatiana Wilkins MD   Urologic Oncologist  [facility] and Kalyn Ray  Professor of Urology  [facility]  Office [phone] Ext [phone] June 17, 2017

## 2017-06-17 NOTE — PROGRESS NOTES
[facility]: This patient meets P & T approved criteria for conversion from IV to oral therapy for the following medication:     Levofloxacin 750 mg IV q24h was discontinued and Levofloxacin 750 mg PO q24h was ordered. If the patient no longer meets all criteria for oral therapy, the pharmacist will switch back to IV therapy. Thanks.

## 2017-06-17 NOTE — PROGRESS NOTES
1951 Bedside and Verbal shift change report given to ELSA Lay RN (oncoming nurse) by Leanne Sunshine RN and Wm Avilez RN (offgoing nurse). Report included the following information SBAR, Kardex, Intake/Output, MAR and Recent Results. Made aware pt's Cardizem drip has been discontinued. Patient in bed awake. Bed in low position, call bell within reach, and patient requests snacks. Will give pt snack, and continue to monitor.      2035 Shift assessment completed. Patient denies having any pain. Will continue to monitor.     0036 Reassessment completed. Patient in bed awake and denies any discomfort or distress. Will continue to monitor.      0405 Reassessment completed. Patient in bed awake. No changes noted. Will continue to monitor.      0726 Bedside and Verbal shift change report given to Leanne Sunshine RN (oncoming nurse) by Jossy Henry RN (offgoing nurse). Report included the following information SBAR, Kardex, Intake/Output, MAR and Recent Results.

## 2017-06-17 NOTE — PROGRESS NOTES
0800-assessment completed. No signs of distress. 1200-no signs of distress. 1600-no signs of distress. 1800-DC'ed hager catheter. Proceeded with voiding trial. Patient voided additional 50 mls. Bedside shift change report given to KATHRINE Tristan (oncoming nurse) by Cassi Rudd (offgoing nurse). Report included the following information SBAR.

## 2017-06-17 NOTE — ROUTINE PROCESS
0800-Assessment completed. No signs of distress. 1200- No signs of distress. 1600-No signs of distress. Bedside shift change report given to RN Carlton Thao (oncoming nurse) by Yaya Rai (offgoing nurse). Report included the following information SBAR.

## 2017-06-18 NOTE — MANAGEMENT PLAN
Family would like to make sure pt will have [address] PT/OT at [facility]. Pt may benefit from SNF rehab, but may do worse at a rehab due to dementia.  Will need orders for [address] RN BSN  Outcomes Manager  Pager # [phone]

## 2017-06-18 NOTE — ROUTINE PROCESS
Bedside, Verbal and Written shift change report given to Jared Edouard RN (oncoming nurse) by Rudy Robles RN (offgoing nurse). Report included the following information SBAR, Kardex, Intake/Output, MAR, Recent Results, Med Rec Status, Procedure Summary and Cardiac Rhythm A-Fib.     2501 - Shift assessment completed. Pt alert and oriented x4. No respiratory distress noted. No c/o pain reported. Call bell within reach, bed in low position. Will continue to monitor.      1235 - Shift re-assessment completed, no change in pt condition.      1340 - Notified Care Manager Berto Du RN that pt's family requesting Home Health/Rehab PT/OT at the [facility] for discharge. 1635 - Shift re-assessment completed, no change in pt condition.      Bedside, Verbal and Written shift change report given to Apple Michele RN (oncoming nurse) by Jared Edouard RN (offgoing nurse). Report included the following information SBAR, Kardex, Intake/Output, MAR, Recent Results, Med Rec Status, Procedure Summary and Cardiac Rhythm A-Fib.

## 2017-06-18 NOTE — PROGRESS NOTES
Assume pt's care  2336: Pt attempt to roll over out of bed, bed alarm device in-place. Pt appears confuse and altered, pt re-orient to place, time and situation, will continue to monitor. 0455: Pt asleep, no signs of distress noted, will continue to monitor. Bedside and Verbal shift change report given to Franca Bravo RN (oncoming nurse) by Denisha Zimmer RN (offgoing nurse). Report included the following information SBAR, Kardex, Procedure Summary, Intake/Output, MAR and Med Rec Status.

## 2017-06-18 NOTE — PROGRESS NOTES
1925 Bedside and Verbal shift change report given to ELSA Cisse RN (oncoming nurse) by Thompson Jimenez RN and David Coe RN (offgoing nurse). Report included the following information SBAR, Kardex, Intake/Output, MAR and Recent Results. Patient in bed awake. Bed in low position, call bell within reach, and patient requests ice water and urinal to be emptied. David Coe RN gave pt ice water and emptied urinal. Will continue to monitor.       2040 Shift assessment completed. Patient denies having any pain. Will continue to monitor.     0008 Reassessment completed. Patient sitting on the edge of the bed attempting to get up. Patient instructed to lay back in bed and demonstrated using the call bell how to call for assistance. Patient stated \"I need go close the garage door. \" Patient made aware he is at the hospital. Bed alarm on, bed in low position, and call bell within reach. Will continue to monitor. 1794 Patient pulled out IV and tele leads, and sitting at the edge of the bed attempting to get up. Patient stated \"I need to go start my car up. \" Made patient aware of room and hospital. Bed alarm on, bed in low position, and call bell within reach. Will continue to monitor. 2950 Patient sitting on the edge of the bed attempting to get up. Patient repositioned back in bed. Bed alarm on, bed in low position, and call bell within reach. Will continue to monitor. 0410 Reassessment completed. Patient in bed sleeping with no signs of pain or distress noted. Will continue to monitor. 2751 Patient sitting at the edge of bed and stated \"I need to go to the bathroom. \" Offered patient his urinal. CNA attempted to place bed pan under patient. Patient had already had a bowel movement. Provided incontinence care to patient and repositioned patient. Patient tolerated well. Replaced tele batteries and leads. Will continue to monitor.       0740 Bedside and Verbal shift change report given to RAUL Wagner RN (oncoming nurse) by Jamila Mcmahan RN (offgoing nurse). Report included the following information SBAR, Kardex, Intake/Output, MAR and Recent Results.

## 2017-06-19 NOTE — PROGRESS NOTES
Chart reviewed. Noted orders for discharge. Spoke with nursing @ [facility] & informed of discharge for today, states ok to return. [facility] set up for 1200. Spoke with pt's wife & made her aware of above, agreeable to transfer. Pt's nurse made aware of above. Available as needed. Brittany Barajas RN,ext. [phone].

## 2017-06-19 NOTE — PROGRESS NOTES
Care Management Interventions  Mode of Transport at Discharge: BLS  Transition of Care Consult (CM Consult): Discharge Planning, [address]: No  Reason Outside [place]: Patient already serviced by other home care/hospice agency ([facility] [address])  Discharge Durable Medical Equipment: No  Physical Therapy Consult: Yes  Occupational Therapy Consult: No  Speech Therapy Consult: Yes  Current Support Network: Assisted Living ([facility])  Confirm Follow Up Transport: Family  Plan discussed with Pt/Family/Caregiver: Yes  Freedom of Choice Offered: Yes  Discharge Location  Discharge Placement: Home with home health

## 2017-06-19 NOTE — PROGRESS NOTES
Resumption of home care referral sent to [facility] via [facility] and called to the intake rep, Romana Pierini for f/u.

## 2017-06-26 NOTE — ED PROVIDER NOTES
HPI Comments: Surjit Corcoran is a 67 y.o. Male who lives in nursing facility with noted change in mental status, not acting himself. No  Noted fever. Was recently admitted for sepsis earlier this month, with uti, placed on levaquin. Pt not able to give any history due to his current ams    The history is provided by the EMS personnel, medical records and a relative. The history is limited by the condition of the patient. Past Medical History:   Diagnosis Date    Alzheimer's dementia without behavioral disturbance     BPH (benign prostatic hypertrophy)     CAD (coronary artery disease)     Delirium superimposed on dementia     Elevated PSA     Esophageal reflux     Essential hypertension     Heart disease     Hematuria     Hyperlipidemia     Kidney stone     Lower urinary tract symptoms (LUTS)     Neurogenic bladder     Organic brain syndrome     mild    Prostate disease     Thrombocytopenia ([address]     Vision decreased        Past Surgical History:   Procedure Laterality Date    CARDIAC SURG PROCEDURE UNLIST      HX COLONOSCOPY      x 3.      HX HEART CATHETERIZATION  09/23/2013    [address] and 2013    HX TONSILLECTOMY           History reviewed. No pertinent family history. Social History     Social History    Marital status:      Spouse name: N/A    Number of children: N/A    Years of education: N/A     Occupational History    Not on file. Social History Main Topics    Smoking status: Never Smoker    Smokeless tobacco: Never Used    Alcohol use No    Drug use: No    Sexual activity: Not on file     Other Topics Concern    Not on file     Social History Narrative         ALLERGIES: Review of patient's allergies indicates no known allergies.     Review of Systems   Unable to perform ROS: Mental status change       Vitals:    06/26/17 2345 06/27/17 0030 06/27/17 0115 06/27/17 0200   BP: 111/66 123/74 104/78 91/75   Pulse: (!) 101 96 (!) 104 (!) 105 Resp: 19 16 16 19   Temp: (!) 36.3 °F (2.4 °C) (!) 36.3 °F (2.4 °C) (!) 36.3 °F (2.4 °C) (!) 36.3 °F (2.4 °C)   SpO2: 99% 99% 99% 99%   Weight:                Physical Exam   Constitutional:  Non-toxic appearance. He does not appear ill. He appears distressed. Elderly appearing, awake, eyes open     HENT:   Head: Normocephalic and atraumatic. Right Ear: External ear normal.   Left Ear: External ear normal.   Nose: Nose normal.   Mouth/Throat: Uvula is midline and oropharynx is clear and moist. Mucous membranes are dry. No oral lesions. No oropharyngeal exudate, posterior oropharyngeal edema or posterior oropharyngeal erythema. Eyes: Conjunctivae are normal.   Neck: Normal range of motion. Cardiovascular: Regular rhythm and intact distal pulses. Tachycardia present. Murmur heard. Pulmonary/Chest: Effort normal and breath sounds normal. No respiratory distress. He has no rales. Abdominal: Soft. There is no tenderness. There is no rebound. Musculoskeletal: Normal range of motion. He exhibits no edema. Neurological: He is alert. Alert to self, but unable to tell me why he is here. Moves all 4 ext well     Skin: Skin is warm and dry. He is not diaphoretic. Psychiatric: His behavior is normal.   Nursing note and vitals reviewed.        [facility]  ED Course       Procedures    Vitals:  Patient Vitals for the past 12 hrs:   Temp Pulse Resp BP SpO2   06/27/17 0200 (!) 36.3 °F (2.4 °C) (!) 105 19 91/75 99 %   06/27/17 0115 (!) 36.3 °F (2.4 °C) (!) 104 16 104/78 99 %   06/27/17 0030 (!) 36.3 °F (2.4 °C) 96 16 123/74 99 %   06/26/17 2345 (!) 36.3 °F (2.4 °C) (!) 101 19 111/66 99 %   06/26/17 2300 (!) 36.3 °F (2.4 °C) (!) 104 20 90/71 99 %   06/26/17 2230 (!) 36.2 °F (2.3 °C) (!) 104 17 - 97 %   06/26/17 2215 (!) 36.2 °F (2.3 °C) (!) 109 22 97/77 100 %   06/26/17 2200 (!) 36.2 °F (2.3 °C) (!) 108 13 112/56 98 %   06/26/17 2145 (!) 36.2 °F (2.3 °C) (!) 101 13 120/64 99 %   06/26/17 2130 (!) 36.2 °F (2.3 °C) 98 13 119/66 100 %   06/26/17 2115 (!) 36.3 °F (2.4 °C) 96 17 116/64 99 %   06/26/17 2112 (!) 36.3 °F (2.4 °C) (!) 101 21 - 99 %   06/26/17 2110 - - - 114/71 -   06/26/17 2033 - - - - 99 %   06/26/17 2015 (!) 36.1 °F (2.3 °C) 94 17 127/70 99 %   06/26/17 2008 (!) 35.9 °F (2.2 °C) 96 20 116/65 97 %   06/26/17 1919 97.8 °F (36.6 °C) - - - -   06/26/17 1852 - (!) 124 22 94/52 98 %   06/26/17 1845 - - 11 94/52 99 %         Medications ordered:   Medications   sodium chloride (NS) flush 5-10 mL (not administered)   vancomycin (VANCOCIN) 2,000 mg in 0.9% sodium chloride 500 mL IVPB (not administered)   VANCOMYCIN INFORMATION NOTE (not administered)   levoFLOXacin (LEVAQUIN) 750 mg in D5W IVPB (not administered)   piperacillin-tazobactam (ZOSYN) 2.25 g in 0.9% sodium chloride (MBP/ADV) 50 mL MBP (not administered)   0.9% sodium chloride infusion (not administered)   sodium chloride 0.9 % bolus infusion 3,387 mL (0 mL/kg × 112.9 kg IntraVENous IV Completed 6/26/17 2209)   vancomycin (VANCOCIN) 1,000 mg in 0.9% sodium chloride (MBP/ADV) 250 mL adv (0 mg IntraVENous IV Completed 6/26/17 2209)   lidocaine (URO-JET) 2 % jelly (5 mL Urethral Given 6/26/17 2010)   vancomycin (VANCOCIN) 2,000 mg in 0.9% sodium chloride 500 mL IVPB (2,000 mg IntraVENous New Bag 6/26/17 2209)         Lab findings:  Recent Results (from the past 12 hour(s))   EKG, 12 LEAD, INITIAL    Collection Time: 06/26/17  6:46 PM   Result Value Ref Range    Ventricular Rate 121 BPM    Atrial Rate 117 BPM    QRS Duration 142 ms    Q-T Interval 408 ms    QTC Calculation (Bezet) 579 ms    Calculated R Axis -80 degrees    Calculated T Axis -4 degrees    Diagnosis       Atrial fibrillation with rapid ventricular response  Left axis deviation  Right bundle branch block  Possible Lateral infarct , age undetermined  Inferior infarct (cited on or before 12-JUN-2017)  Abnormal ECG  When compared with ECG of 13-JUN-2017 06:31,  Previous ECG has undetermined rhythm, needs review  Borderline criteria for Lateral infarct are now present     CULTURE, BLOOD    Collection Time: 06/26/17  6:50 PM   Result Value Ref Range    Special Requests: PERIPHERAL      Culture result: PENDING    POC LACTIC ACID    Collection Time: 06/26/17  6:58 PM   Result Value Ref Range    Lactic Acid (POC) 2.3 (HH) 0.4 - 2.0 mmol/L   CULTURE, BLOOD    Collection Time: 06/26/17  7:05 PM   Result Value Ref Range    Special Requests: PERIPHERAL      Culture result: PENDING    METABOLIC PANEL, COMPREHENSIVE    Collection Time: 06/26/17  7:05 PM   Result Value Ref Range    Sodium 141 136 - 145 mmol/L    Potassium 3.8 3.5 - 5.5 mmol/L    Chloride 109 (H) 100 - 108 mmol/L    CO2 22 21 - 32 mmol/L    Anion gap 10 3.0 - 18 mmol/L    Glucose 194 (H) 74 - 99 mg/dL    BUN 38 (H) 7.0 - 18 MG/DL    Creatinine 2.32 (H) 0.6 - 1.3 MG/DL    BUN/Creatinine ratio 16 12 - 20      GFR est AA 33 (L) >60 ml/min/1.73m2    GFR est non-AA 27 (L) >60 ml/min/1.73m2    Calcium 8.8 8.5 - 10.1 MG/DL    Bilirubin, total 0.9 0.2 - 1.0 MG/DL    ALT (SGPT) 25 16 - 61 U/L    AST (SGOT) 24 15 - 37 U/L    Alk. phosphatase 138 (H) 45 - 117 U/L    Protein, total 6.8 6.4 - 8.2 g/dL    Albumin 3.0 (L) 3.4 - 5.0 g/dL    Globulin 3.8 2.0 - 4.0 g/dL    A-G Ratio 0.8 0.8 - 1.7     CBC WITH AUTOMATED DIFF    Collection Time: 06/26/17  7:05 PM   Result Value Ref Range    WBC 15.7 (H) 4.6 - 13.2 K/uL    RBC 4.71 4.70 - 5.50 M/uL    HGB 13.7 13.0 - 16.0 g/dL    HCT 41.7 36.0 - 48.0 %    MCV 88.5 74.0 - 97.0 FL    MCH 29.1 24.0 - 34.0 PG    MCHC 32.9 31.0 - 37.0 g/dL    RDW 15.0 (H) 11.6 - 14.5 %    PLATELET 134 420 - 856 K/uL    MPV 9.0 (L) 9.2 - 11.8 FL    NEUTROPHILS 77 (H) 40 - 73 %    LYMPHOCYTES 13 (L) 21 - 52 %    MONOCYTES 10 3 - 10 %    EOSINOPHILS 0 0 - 5 %    BASOPHILS 0 0 - 2 %    ABS. NEUTROPHILS 12.1 (H) 1.8 - 8.0 K/UL    ABS. LYMPHOCYTES 2.0 0.9 - 3.6 K/UL    ABS. MONOCYTES 1.5 (H) 0.05 - 1.2 K/UL    ABS. EOSINOPHILS 0.1 0.0 - 0.4 K/UL    ABS. BASOPHILS 0.0 0.0 - 0.06 K/UL    DF AUTOMATED     CARDIAC PANEL,(CK, CKMB & TROPONIN)    Collection Time: 06/26/17  7:05 PM   Result Value Ref Range    CK 22 (L) 39 - 308 U/L    CK - MB <1.0 <3.6 ng/ml    CK-MB Index Cannot be calulated 0.0 - 4.0 %    Troponin-I, Qt. <0.02 0.0 - 0.045 NG/ML   TSH 3RD GENERATION    Collection Time: 06/26/17  7:05 PM   Result Value Ref Range    TSH 3.08 0.36 - 3.74 uIU/mL   MAGNESIUM    Collection Time: 06/26/17  7:05 PM   Result Value Ref Range    Magnesium 2.4 1.6 - 2.6 mg/dL   URINALYSIS W/ RFLX MICROSCOPIC    Collection Time: 06/26/17  8:05 PM   Result Value Ref Range    Color YELLOW      Appearance CLOUDY      Specific gravity 1.019 1.005 - 1.030      pH (UA) 5.5 5.0 - 8.0      Protein 100 (A) NEG mg/dL    Glucose NEGATIVE  NEG mg/dL    Ketone NEGATIVE  NEG mg/dL    Bilirubin NEGATIVE  NEG      Blood MODERATE (A) NEG      Urobilinogen 0.2 0.2 - 1.0 EU/dL    Nitrites NEGATIVE  NEG      Leukocyte Esterase MODERATE (A) NEG     URINE MICROSCOPIC ONLY    Collection Time: 06/26/17  8:05 PM   Result Value Ref Range    WBC 40 to 50 0 - 4 /hpf    RBC 8 to 10 0 - 5 /hpf    Epithelial cells 2+ 0 - 5 /lpf    Bacteria 3+ (A) NEG /hpf       EKG interpretation by ED Physician:  afib with rvr, rbbb  No evidence of acute ischemic st tw changes  Rate 121, qtc 579    X-Ray, CT or other radiology findings or impressions:  XR CHEST PORT    (Results Pending)   CT HEAD WO CONT    (Results Pending)   CT ABD PELV WO CONT    (Results Pending)   ct head with nothing acute  Ct abd/pel with left ureteral stent in place, known cholelithiasis    Progress notes, Consult notes or additional Procedure notes:   Hr improved. bp improved. Still with uti which is likely source.  Given abx here, fluids  Dw/ wife results, need for admission  ED Critical Care Note    System at risk for life threatening failure: cardiac, renal, pulm  Associated problems: tachycardia, hypotension, infection    Critical Care services provided: bedside management, consult  Excluded procedures (time not included in critical care): ecg interp    Total Critical Care Time (in minutes) 46    D/w dr Ramila Reid on call for hospitalist who will admit          Reevaluation of patient:   Stable, improved    Disposition:  Diagnosis:   1. Altered mental status, unspecified altered mental status type    2. Acute kidney injury (nontraumatic) ([facility]    3. Recurrent UTI        Disposition: admit      Follow-up Information     None            Patient's Medications   Start Taking    No medications on file   Continue Taking    ACETAMINOPHEN (TYLENOL) 325 MG TABLET    Take 650 mg by mouth every four (4) hours as needed for Pain. ASPIRIN 81 MG TABLET    Take 81 mg by mouth. DOCUSATE SODIUM (COLACE) 100 MG CAPSULE    Take 100 mg by mouth two (2) times a day. DUTASTERIDE-TAMSULOSIN (DOMINGA) 0.5-0.4 MG CM24    Take 1 Cap by mouth daily (after dinner). FINASTERIDE (PROSCAR) 5 MG TABLET    Take 5 mg by mouth daily. FUROSEMIDE (LASIX) 40 MG TABLET    Take  by mouth daily. LEVOFLOXACIN (LEVAQUIN) 750 MG TABLET    Take 1 Tab by mouth every twenty-four (24) hours for 8 days. MEMANTINE (NAMENDA) 10 MG TABLET    Take  by mouth daily. MENTHOL-ZINC OXIDE (RISAMINE) 0.44-20.6 % OINT    Apply 0.44 Each to affected area. METOPROLOL TARTRATE 75 MG TAB    Take 75 mg by mouth two (2) times a day. POTASSIUM CHLORIDE (KLOR-CON) 20 MEQ PACKET    Take 20 mEq by mouth two (2) times a day. ROSUVASTATIN (CRESTOR) 5 MG TABLET    Take  by mouth nightly. TAMSULOSIN (FLOMAX) 0.4 MG CAPSULE    Take 0.4 mg by mouth daily.    These Medications have changed    No medications on file   Stop Taking    No medications on file

## 2017-06-26 NOTE — ED NOTES
The Sepsis Screening has been completed on arrival in the Emergency Department.   YES  Vital signs:  Patient Vitals for the past 4 hrs:   Pulse Resp BP SpO2   06/26/17 1852 (!) 124 22 94/52 98 %   06/26/17 1845 - - (!) 88/59 -            =monitored (data validate)  MAP (Calculated): 66    =calculated (manual entry)    Is patient is 29y/o or older, meets 2 or more ABNORMAL VITAL SIGNS below, associated with SUSPECTED INFECTION?  YES     Temperature < 96.8°F (36°C) or > 100.9°F (38.3°C)   Heart Rate > 90 beats per minute   Respiratory Rate > 20 beats per minute   BP < 90 systolic    MAP < 65    IF ANSWER IS YES, CALL A CODE SEPSIS  POC LACTIC ACID ASAP AND BEGIN NURSE DRIVEN SEPSIS ORDERS WHILE AWAITING PROVIDER

## 2017-06-27 PROBLEM — N17.9 AKI (ACUTE KIDNEY INJURY) (HCC): Status: ACTIVE | Noted: 2017-01-01

## 2017-06-27 PROBLEM — R41.82 ALTERED MENTAL STATE: Status: ACTIVE | Noted: 2017-01-01

## 2017-06-27 NOTE — PROGRESS NOTES
[facility] Dosing Services    Consult requested by Dr. Shane Booker for Vancomycin therapy  Indication: Sepsis of Unknown Etiology  Goal Level(s): 15-20 mcg/ml  Ke:  0.03  T 1/2:  23 hours  Vd:  79 liters    67 y.o., male   Height / Weight:     Ht Readings from Last 1 Encounters:   06/18/17 188 cm (74\")        Wt Readings from Last 1 Encounters:   06/26/17 112.9 kg (249 lb)      Temp: 97.8 °F (36.6 °C)    Serum Creatinine:   Lab Results   Component Value Date/Time    Creatinine 2.32 06/26/2017 07:05 PM     Creatinine Clearance:  Estimated Creatinine Clearance: 35.6 mL/min (based on Cr of 2.32).   BUN:    Lab Results   Component Value Date/Time    BUN 38 06/26/2017 07:05 PM      WBC / C&S:    Lab Results   Component Value Date/Time    WBC 15.7 06/26/2017 07:05 PM    Culture result: PENDING 06/26/2017 07:05 PM       Other Current Antibiotics:    Current Antimicrobial Therapy (168h ago through future)      Ordered     Start Stop      06/26/17 2020  VANCOMYCIN INFORMATION NOTE  Other,   ONCE      07/02/17 2030 07/03/17 0829    06/26/17 2015  vancomycin (VANCOCIN) 2,000 mg in 0.9% sodium chloride 500 mL IVPB  2,000 mg,   IntraVENous,   EVERY 48 HOURS      06/28/17 2100 --    06/26/17 2010  vancomycin (VANCOCIN) 2,000 mg in 0.9% sodium chloride 500 mL IVPB  2,000 mg,   IntraVENous,   ONCE      06/26/17 2100 06/27/17 0859    06/26/17 1856  vancomycin (VANCOCIN) 1,000 mg in 0.9% sodium chloride (MBP/ADV) 250 mL adv  1,000 mg,   IntraVENous,   ONCE      06/26/17 1857 06/27/17 0656    06/26/17 1856  piperacillin-tazobactam (ZOSYN) 4.5 g in 0.9% sodium chloride (MBP/ADV) 100 mL MBP  4.5 g,   IntraVENous,   EVERY 6 HOURS      06/26/17 1857 --    06/26/17 1856  levoFLOXacin (LEVAQUIN) 750 mg in D5W IVPB  750 mg,   IntraVENous,   EVERY 24 HOURS      06/26/17 1857 --              Initiate therapy with loading dose of: Vancomycin - Administer an additional vancomycin IV 2 grams NOW + the 1 gram given earlier this evening for a total loading dose of 3 grams. Follow with maintenance dose of: Vancomycin IV 2 grams every 48 hours. Due to the patient's reduced creatinine clearance, a vancomycin serum trough has been ordered early for Sunday night, 2 July before the third new maintenance dose. Pharmacy will follow daily and make changes to dose and/or frequency as needed. BRANDI Velázquez, Pharmacist  6/26/2017 8:21 PM

## 2017-06-27 NOTE — PROGRESS NOTES
Patient received in bed awake from ED. Patient alert and oriented to self, denies pain and discomfort. Patient resting quietly. Frequent use items within reach. Bed locked in low position. Call bell within reach and patient verbalized understanding of use for assistance and needs. 1141: Wife and son called in regard to Crestor medication. NO answer. Voicemail left for the wife. 1420: Cody Parrish called tele after viewing monitor and seeing patient heart rate in the 150s. Tele confirmed patient was running uncontrolled V-tach in the 160's     1430: Dr. Gama Murillo paged in regards to patient heart rate going up into the 160s and tele confirming uncontrolled V-tach. 1435: Dr. Gama Murillo returned paged informing RN that he will be by to evaluate patient shortly. Patient heart rate fluctuating between the 110s and 130s. Patient resting in bed. Denies pain. Free from any s/s of distress. Bed locked in lowest position, side rails up x3, can call bell within reach. 1515: Pt pulled out IV in LEFT wrist. Rossana Mckenzie RNO was unable to obtain IV access. Yared Leonard, RN nursing supervisor and Negrita CHISHOLM nurse manager notified and told to call PICC nurse. 1520: PICC nurse called by Cody Parrish and was told that she would be up to evaluate and try to establish IV access. 1540: Dr. Gama Murillo paged to be notified of lack of IV access. 1601: Dr. Gama Murillo returned page and informed of lack of IV access. Expressed understanding and thanks for the call.

## 2017-06-27 NOTE — ED NOTES
SIDE RAILS UP X 2   BED IN LOW AND LOCKED  POSITION  FLUIDS AND TOILETING OFFERED  PLAN OF CARE REVIEWED WITH PT/FAMILY  IV SITE ASSESSED   PAIN ASSESSED  COMFORT ADDRESSED  CALL BELL WITHIN REACH    Patient repositioned. Moves self. Requires frequent reorientation.

## 2017-06-27 NOTE — ED NOTES
Patient attempting to climb out of bed. Patient pulling at hager cath and IV. Patient yelling out \"I have to go to the Dorian\" and grabbing at his penis. Patient instructed that he has a hager intact and doesn't need to get up to go to the bathroom. Patient pulled up in bed and instructed to not try and get out of bed.

## 2017-06-27 NOTE — PROGRESS NOTES
Reason for Renal Dosing:  Per Consult    Indication: Sepsis of Unknown Etiology    Patient clinical status and labs ordered/reviewed. Pt Weight Weight: 112.9 kg (249 lb)   Serum Creatinine Lab Results   Component Value Date/Time    Creatinine 2.32 06/26/2017 07:05 PM       Creatinine Clearance Estimated Creatinine Clearance: 35.6 mL/min (based on Cr of 2.32). BUN Lab Results   Component Value Date/Time    BUN 38 06/26/2017 07:05 PM       WBC Lab Results   Component Value Date/Time    WBC 15.7 06/26/2017 07:05 PM      Temperature Temp: 97.8 °F (36.6 °C)   HR Pulse (Heart Rate): (!) 124       BP BP: 94/52           Drug type: Quinolone antibacterial      Drug/dose: Renally dosed to 750 mg IV every 48 hours. Continue to monitor.     Signed Latisha Weiner PHARMD  Date 6/26/2017  Time 8:29 PM

## 2017-06-27 NOTE — PROGRESS NOTES
completed the initial Spiritual Assessment of the patient in bed 4 of the emergency room , and offered Pastoral Care support.,   Patient is semi-alert  And according to staff patient needs to do an advance directive. Patient does not have any Orthodoxy/cultural needs that will affect patients preferences in health care.    Chaplains will continue to follow and will provide pastoral care on an as needed/requested basis     Chaplain Slick Castillo   Board Certified [address] [place] [address]   [phone]

## 2017-06-27 NOTE — CDMP QUERY
Please clarify if this patient is being treated/managed for:    =>Severe sepsis (POA) in the setting of UTI with elevated WBCs, lactic acid and HR.  =>Other Explanation of clinical findings  =>Unable to Determine (no explanation of clinical findings)    The medical record reflects the following:    Risk: Age, UTI, recent sepsis admission. Clinical Indicators: On admission, WBCs 15.7, lactic acid 2.3, heart rate consistently > 90 (). UTI noted . Mental status change. Recently admitted for sepsis earlier this month. Treatment: Sepsis protocol in ED: NS 3387 ml, Vancomycin, Zosyn, Levaquin. Please clarify and document your clinical opinion in the progress notes and discharge summary including the definitive and/or presumptive diagnosis, (suspected or probable), related to the above clinical findings. Please include clinical findings supporting your diagnosis. If you DECLINE this query or would like to communicate with [facility], please utilize the \"[employer] message box\" at the TOP of the Progress Note on the right.       Thank you,    Didi Pacheco RN [address] [place] [address]

## 2017-06-27 NOTE — CDMP QUERY
Please clarify if this patient is being treated/managed for:    =>Metabolic encephalopathy or  =>Septic encephalopathy in the setting of altered mental status  =>Other Explanation of clinical findings  =>Unable to Determine (no explanation of clinical findings)    The medical record reflects the following:    Risk: Age, UTI, recent sepsis    Clinical Indicators: Per ED provider: \"Pt not able to give any history due to his current ams. \" Per nursing: \"Patient is more altered than normal per caregivers - attempting to climb out of bed, pulling at hager cath and IV - yelling out - attempts to ambulate without assistance - restless. \" Per H&P: \"Patient is altered. Is oriented to self only. \"  Adm for UTI, AMS, BREANNE. On adm, wbc 15.7, lactic acid 2.3, bun 38, creat 2.32. Treatment: IVF,  IV abx, frequent monitoring by nursing    Please clarify and document your clinical opinion in the progress notes and discharge summary including the definitive and/or presumptive diagnosis, (suspected or probable), related to the above clinical findings. Please include clinical findings supporting your diagnosis. If you DECLINE this query or would like to communicate with [facility], please utilize the \"[facility] message box\" at the TOP of the Progress Note on the right.       Thank you,    Tamica Poole RN  [facility] RN [address] [facility]

## 2017-06-27 NOTE — ED NOTES
SIDE RAILS UP X 2   BED IN LOW AND LOCKED  POSITION  FLUIDS AND TOILETING OFFERED  PLAN OF CARE REVIEWED WITH PT/FAMILY  IV SITE ASSESSED   PAIN ASSESSED  COMFORT ADDRESSED  CALL BELL WITHIN REACH      Patient attempts to ambulate without assistance. Restless. Pulls at lines and medical equipment. Archuleta requires frequent retaping and patient requires frequent redirection.

## 2017-06-27 NOTE — PROGRESS NOTES
Reason for Renal Dosing:  Per Cosult    Indication: Sepsis of Unknown Etiology    Patient clinical status and labs ordered/reviewed. Pt Weight Weight: 112.9 kg (249 lb)   Serum Creatinine Lab Results   Component Value Date/Time    Creatinine 2.32 06/26/2017 07:05 PM       Creatinine Clearance Estimated Creatinine Clearance: 35.6 mL/min (based on Cr of 2.32). BUN Lab Results   Component Value Date/Time    BUN 38 06/26/2017 07:05 PM       WBC Lab Results   Component Value Date/Time    WBC 15.7 06/26/2017 07:05 PM      Temperature Temp: (!) 36.1 °F (2.3 °C)     HR Pulse (Heart Rate): 94     BP BP: 127/70           Drug type: Extended Spectrum Penicillin      Drug/dose: The Zosyn has been renally dosed to 2.25 grams IV every 6 hours (patient in the ED). Once the patient has been transferred to an inpatient bed the pharmacy will evaluate the patient for Zosyn ES dosing. Continue to monitor.     Signed Matias Ravi PHARMD  Date 6/26/2017  Time 8:36 PM

## 2017-06-27 NOTE — PROGRESS NOTES
Patient admitted early this AM by Dr Marley Booker. He has sepsis of likely urinary origin. He has had an episode of atrial fibrillation with RVR today which appears under much better control currently. He seems to be a very poor candidate for anticoagulation. Will continue to follow.

## 2017-06-27 NOTE — PROGRESS NOTES
[facility] Pharmacy Dosing Services     Pharmacy adjusting dose for Piperacillin-Tazobactam (Zosyn) per renal protocol. Was on a regimen of: 2.25 gm IV q6h    Labs:   Serum Creatinine/CrCl: 2.32mg/dl/35.5ml/min      Plan:  Changed Zosyn to 3.375 IV 8h EXTENDED 4 HOUR INFUSION. Pharmacy to follow and will redose based on renal function changes.

## 2017-06-27 NOTE — ED NOTES
Wife reports that he is currently being treated by Dr. Osiris Aguero for urinary stent placement and kidney stones.

## 2017-06-27 NOTE — ANCILLARY DISCHARGE INSTRUCTIONS
[facility] Readmission Patient Aditya Rene    The following concerns the patient's last admission and the events following discharge from the hospital:      Date of last hospital discharge:  6/19/17    Date of Readmission:  6/26/17    Reason for Readmission:  Unable to interview, patient is confused    Were you kept informed about your diagnoses during your stay in the hospital and what was being done to further evaluate and treat them? [] None of time   [] Some of time   [] Most of time   [] All of time   At the time of your discharge, did someone talk to you about:  1. What your diagnoses were? 2. What tests or procedures needed to be done after you left? 3. What to watch out for regarding worsening of your disease? 4. What to do if you were experiencing worsening of your disease? 5. Who to contact (and how) if you were experiencing worsening of your disease? [] Yes  [] No  [] Not Sure   [] Yes  [] No  [] Not Sure   [] Yes  [] No  [] Not Sure   [] Yes  [] No  [] Not Sure   [] Yes  [] No  [] Not Sure   Were you asked about your understanding of these instructions? [] Yes  [] No  [] Not Sure   Were the discharge instructions written down and given to you before you left? [] Yes  [] No  [] Not Sure   Were the written discharge instructions and plans easy to read and understand? [] Yes  [] No  [] Not Sure   How confident were you about understanding these instructions? [] Very confident   [] Somewhat confident   [] Not confident   [] Not Sure   Do you have a regular doctor who takes care of you for most things? [] Yes  [] No  [] Not Sure   At the time of your discharge, did someone talk to you about which medications to take when you left and which ones to discontinue? [] Yes  [] No  [] Not Sure   Did you take your medications as they were prescribed? [] Yes  [] No  [] Not Sure   If not, what were the difficulties you experienced with taking your medications? Comment:          After you left the hospital, did you have an appointment with your doctor? [] Yes  [] No  [] Not Sure       If yes, who made the appointment? [] I did    [] My family   [] Hospital staff   [] Not Sure   Were you able to get to this appointment?    [] Yes  [] No  [] Not Sure   How do you think you became sick enough to be readmitted to the hospital?   Comment:          Source:  Modified from [facility], [place], [place]

## 2017-06-27 NOTE — H&P
History and Physical    Patient: Robert Scott               Sex: male          DOA: 6/26/2017       YOB: 1940      Age:  67 y.o.        LOS:  LOS: 0 days        HPI:     Robert Scott is a 67 y.o. male who presents to the ED secondary to altered mental status. The patient resides at the [facility]. According to his wife, he was recently treated for stones and uti. Urology placed a stent and patient was discharged to the [facility], where he has been for approximately one week. Today, the staff noticed that the patient was not himself. Patients wife reports that his blood pressure was low and he suffered a fall. It is unclear if he injured himself at that time. EMS was called and patient brought in. He is unable to contribute to his history. While in the ED, patient found to have uti, elevated creatinine. IV antibiotics initiated. patient admitted for further evaluation. Past Medical History:   Diagnosis Date    Alzheimer's dementia without behavioral disturbance     BPH (benign prostatic hypertrophy)     CAD (coronary artery disease)     Delirium superimposed on dementia     Elevated PSA     Esophageal reflux     Essential hypertension     Heart disease     Hematuria     Hyperlipidemia     Kidney stone     Lower urinary tract symptoms (LUTS)     Neurogenic bladder     Organic brain syndrome     mild    Prostate disease     Thrombocytopenia (HCC)     Vision decreased        Past Surgical History:   Procedure Laterality Date    CARDIAC SURG PROCEDURE UNLIST      HX COLONOSCOPY      x 3.      HX HEART CATHETERIZATION  09/23/2013    HX HERNIA REPAIR  1996 and 2013    HX TONSILLECTOMY         Social History     Social History    Marital status:      Spouse name: N/A    Number of children: N/A    Years of education: N/A     Occupational History    Not on file.      Social History Main Topics    Smoking status: Never Smoker    Smokeless tobacco: Never Used    Alcohol use No    Drug use: No    Sexual activity: Not on file     Other Topics Concern    Not on file     Social History Narrative       History reviewed. No pertinent family history. No Known Allergies    Review of Systems:  Positive in bold. Unable to obtain. Physical Exam:      Visit Vitals    /66    Pulse (!) 101    Temp (!) 36.3 °F (2.4 °C)    Resp 19    Wt 112.9 kg (249 lb)    SpO2 99%    BMI 31.97 kg/m2       Physical Exam:  Gen:  No distress, awake and oriented x 1  HEENT:  Normal cephalic atraumatic, extra-occular movements are intact. Neck:  Supple, No JVD  Lungs:  Clear bilaterally, no wheeze, no rales, normal effort  Cardiovascular:  tachycardic and normal rhythm, normal S1 and S2, + murmur. Capillary refill: < 3 seconds. Abdomen:  Soft, non tender, non distended, normal bowel sounds, no guarding. Extremities:  Well perfused, no cyanosis, no edema  Neurological:  Awake and alert, CN's are intact, normal strength throughout extremities  Skin:  No rashes or moles. Turgor and color normal  Mental Status:  Patient is altered. Is oriented to self only. does not appear anxious      Laboratory Studies: All lab results for the last 24 hours reviewed. Assessment/Plan     Active Problems:    UTI (urinary tract infection) (6/12/2017)      Altered mental state (6/27/2017)      BREANNE (acute kidney injury) ([facility] [address] (6/27/2017)        PLAN:    Infectious: UTI   Continue IV antibiotics-  Vanc/Lev/Zos   Continuous IV fluid    CV: HTN. Well controlled. Admit to tele   Continue antihypertensive medication as per home   Monitor vitals as per unit   EKG in am    Heme:  DVT prophylaxis   Bilateral lower extremity compression devices    GI: Nausea, vomiting, diarrhea, abdominal pain   Zofan as needed. Keep NPO    Nephro: BREANNE   Repeat chemistry in am   Continuous IV fluid    Neuro: AMS. Is likely from uti.   Patient had similar symptoms in the past  Fall precautions   Ambulate with assistance   Neurovascular checks    Misc:  FULL CODE   Physical therapy to evaluate and treat   Monitor intake and output   Monitor vitals as per unit   Replace electrolytes as needed. Follow up am labs.           Cherie Viera MD

## 2017-06-28 NOTE — PROGRESS NOTES
Progress Note      Patient: Regina Yoon               Sex: male          DOA: 6/26/2017       YOB: 1940      Age:  67 y.o.        LOS:  LOS: 1 day             CHIEF COMPLAINT:  Sepsis with altered mental status    Subjective:     Awake and interacting  Confusion persists    Objective:      Visit Vitals    /85 (BP 1 Location: Left arm, BP Patient Position: Head of bed elevated (Comment degrees))    Pulse 91    Temp 97.8 °F (36.6 °C)    Resp 20    Ht 6' 2\" (1.88 m)    Wt 112 kg (246 lb 14.6 oz)    SpO2 99%    BMI 31.7 kg/m2       Physical Exam:  Gen:  No distress today, no complaint. Lungs:  Clear bilaterally, no wheezes or rales. Heart:  Regular rate and rhythm. No murmur. Lab/Data Reviewed: All lab results for the last 24 hours reviewed. Assessment/Plan     Principal Problem:    BREANNE (acute kidney injury) ([facility] [address] (6/27/2017)    Active Problems:    UTI (urinary tract infection) (6/12/2017)      Altered mental state (6/27/2017)        Plan:  Continue with IV antibiotics  Monitor mental status  Monitor renal function.

## 2017-06-28 NOTE — PROGRESS NOTES
Assume care resting in bed. A/O to self. No s/s of distress. Bed on lowest position and wheels lock. Call bell within reach. 0900 I saw Pt walking to 2114, pulled his IV out and hager detach from bag. Stating he needs to go urinate and ask him to go back to his room. He wanted to know where he was going. 0330 Bedside and Verbal shift change report given to KATHRINE elena (oncoming nurse) by Marietta Dangelo RN   (offgoing nurse). Report included the following information SBAR, Kardex, Intake/Output and MAR.

## 2017-06-28 NOTE — INTERDISCIPLINARY ROUNDS
IDR Summary      Patient: Severa Fees MRN: [id number]    Age: 67 y.o.  : 1940     Admit Diagnosis: Altered mental state  UTI (urinary tract infection)  BREANNE (acute kidney injury) [facility])      Problems pertinent to hospital stay:     Consults: P. T and Case Management     Testing due for patient today?  NO    Nutrition plan:Yes     Mobility needs: Yes      Lines/Tubes:   IV: YES   Needed: YES  Archuleta: YES  Needed:YES  Central Line: NO Needed: NO      VTE Prophylaxis: Chemical          Care Management:  Discharge plan: detention  PCP: Nirmal Cohen MD    : NO  Financial concerns:No   Interventions:       LOS: 1 days     Expected days until discharge: 2 days        Signed:     Markos Mercer, FNP-BC  [address]  Hospitalist Division  Pager:  [phone]  Office:  [phone]

## 2017-06-28 NOTE — PROGRESS NOTES
Problem: Discharge Planning  Goal: *Discharge to safe environment  Outcome: Progressing Towards Goal  Assisted living

## 2017-06-28 NOTE — PROGRESS NOTES
[facility]/[address]   Discharge Planning/ Assessment    Reasons for Intervention: Pt  Confused,  Unable to interview,  Called wife she  States pt lives at the [facility] assisted living, at  Baseline pt is mildly  Confused at times but  Fairly  Coherent per wife. The  Staff at  The [facility]  Assisted himwith adls. He amb  With a walker. Took meals in dining rm. No dme . Pt  Was recently  At  [facility]  For snf. pcp Dr Joi Gruber. She prefers he return  Directly to assisted living, told her they  Would need to eval pt, if  They  Do not  Accept  Directly  Back, she agrees to snf, posted in edc to  Choices. First  [facility]  [facility]. Plan  Return  To  assisted living for now.       High Risk Criteria  [x] Yes  []No   Physician Referral  [] Yes  [x]No        Date    Nursing Referral  [] Yes  [x]No        Date    Patient/Family Request  [] Yes  [x]No        Date       Resources:    Medicare  [x] Yes  []No   Medicaid  [] Yes  []No   No Resources  [] Yes  []No   Private Insurance  [x] Yes  []No    Name/Phone Number    Other  [] Yes  []No        (i.e. Workman's Comp)         Prior Services:    Prior Services  [x] Yes  []No   Home Health  [x] Yes  []No   [address]  [] Yes  []No        Number of [address]  [] Yes  []No       Meals on Wheels  [] Yes  []No   Office on Aging  [] Yes  []No   Transportation Services  [] Yes  []No   Nursing Home  [] Yes  []No        Nursing Home Name    [address]  [] Yes  []No        [address] Name    Other       Information Source:      Information obtained from  [] Patient  [] Parent   [] [address]  [] Child  [x] Spouse   [] Significant Other/Partner   [] Friend      [] EMS    [] Nursing Home Chart          [] Other:   Chart Review  [] Yes  []No     Family/Support System:    Patient lives with  [] Alone    [] Spouse   [] Significant Other  [] Children  [] Caretaker   [] Parent [] Sibling     [x] Other       Other Support System:    Is the patient responsible for care of others  [] Yes  [x]No   Information of person caring for patient on  discharge    Managers financial affairs independently  [] Yes  [x]No   If no, explain:      Status Prior to Admission:    Mental Status  [] Awake  [] Alert  [x] Oriented  [] Quiet/Calm [] Lethargic/Sedated   [] Disoriented  [] Restless/Anxious  [] Combative   Personal Care  [] Dependent  [] [address]  [x] Requires Assistance   Meal Preparation Ability  [] Independent   [] Standby Assistance   [x] Minimal Assistance   [] Moderate Assistance  [] Maximum Assistance     [] Total Assistance   Chores  [] Independent with Chores   [] N/A Nursing Home Resident   [x] Requires Assistance   Bowel/Bladder  [] Continent  [] Catheter  [x] Incontinent  [] Ostomy Self-Care    [] Urine Diversion Self-Care  [] Maximum Assistance     [] Total Assistance   Number of Persons needed for assistance    DME at home  [] Buster Polanco  [] Celestine Polanco   [] Commode    [] Bathroom/Grab Bars  [] Hospital Bed  [] Nebulizer  [] Oxygen           [] Raised Toilet Seat  [] Shower Chair  [] Side Rails for Bed   [] Tub Transfer Bench   [x] Zee Flynn  [] Hector Jordan      [] Other:   Vendor      Treatment Presently Receiving:    Current Treatments  [] Chemotherapy  [] Dialysis  [] Insulin  [] IVAB [] IVF   [] O2  [] PCA   [] PT   [] RT   [] Tube Feedings   [] Wound Care     Psychosocial Evaluation:    Verbalized Knowledge of Disease Process  [] Patient  [x]Family   Coping with Disease Process  [] Patient  []Family   Requires Further Counseling Coping with Disease Process  [] Patient  []Family     Identified Projected Needs:    Home Health Aid  [] Yes  []No   Transportation  [] Yes  []No   Education  [] Yes  []No        Specific Education     Financial Counseling  [] Yes  []No   Inability to Care for Self/Will Require 24 hour care  [] Yes  []No   Pain Management  [] Yes  []No   Home Infusion Therapy  [] Yes  []No   Oxygen Therapy  [] Yes  []No   DME  [] Yes  []No   Long Term Care Placement  [] Yes  []No   Rehab  [] Yes  []No   Physical Therapy  [x] Yes  []No   Needs Anticipated At This Time  [] Yes  []No     Intra-Hospital Referral:    [facility]  [] Yes  [x]No     [] Yes  [x]No   Patient Representative  [] Yes  [x]No   Staff for Teaching Needs  [] Yes  [x]No   Specialty Teaching Needs     Diabetic Educator  [] Yes  [x]No   Referral for Diabetic Educator Needed  [] Yes  [x]No  If Yes, place order for Nutritionist or Diabetic Consult     Tentative Discharge Plan:    Home with No Services  [] Yes  []No   Home with [address]  [] Yes  []No        If Yes, specify type    [address]  [] Yes  []No        If Yes, specify type    Meals on Wheels  [] Yes  []No   Office of Aging  [] Yes  []No   NHP  [] Yes  []No   Return to the Nursing Home  [] Yes  []No   Rehab Therapy  [x] Yes  []No   Acute Rehab  [] Yes  []No   Subacute Rehab  [] Yes  []No   Private Care  [] Yes  []No   Substance Abuse Referral  [] Yes  []No   Transportation  [x] Yes  []No   Chore Service  [] Yes  []No   Inpatient Hospice  [] Yes  []No   OP RT  [] Yes  [] No   OP Hemo  [] Yes  [] No   OP PT  [] Yes  []No   Support Group  [] Yes  []No   Reach to Recovery  [] Yes  []No   OP Oncology Clinic  [] Yes  []No   Clinic Appointment  [] Yes  []No   DME  [] Yes  []No   Comments Assisted living   Name of D/C Planner or  Given to Patient or Family Hannah toscano rn cm    Phone Number [phone]        Extension    Date 6/28/17   Time    If you are discharged home, whom do you designate to participate in your discharge plan and receive any information needed?      Enter name of designee         Phone # of designee         Address of designee         Updated         Patient refused to designate any           individual

## 2017-06-28 NOTE — PROGRESS NOTES
1935: Bedside verbal shift report received from [address]. Pt is resting in bed, no signs of distress, instructed to press call light if assistance is needed, call light within reach. 0800: Bedside and Verbal shift change report given to Radha kaur RN (oncoming nurse) by Jonn Desir RN (offgoing nurse). Report included the following information SBAR, Kardex, Intake/Output, MAR and Recent Results.

## 2017-06-29 NOTE — PROGRESS NOTES
Received awake,alert to person. Hob elevated. In no acute distress. Call light,phone with in reach. Tabs unit on for safety. 6/29/17 0600 Uneventful shift. No agitation or restlessness noted.

## 2017-06-29 NOTE — INTERDISCIPLINARY ROUNDS
IDR Summary      Patient: Regina Yoon MRN: [id number]    Age: 67 y.o.  : 1940     Admit Diagnosis: Altered mental state  UTI (urinary tract infection)  BREANNE (acute kidney injury) ([facility]      Problems pertinent to hospital stay:     Consults:P.T, OT and Case Management     Testing due for patient today?  NO    Nutrition plan:Yes     Mobility needs: Yes      Lines/Tubes:   IV: YES   Needed: YES  [place]: YES  Needed:YES  Central Line: NO Needed: NO      VTE Prophylaxis: Chemical          Care Management:  Discharge plan: ASHWIN  PCP: Anjana Evans MD    : NO  Financial concerns:No   Interventions:       LOS: 2 days     Expected days until discharge: 2 days        Signed:     Dilip Carson, MARTÍNP-BC  [address]  Hospitalist Division  Pager:  [phone]  Office:  [phone]

## 2017-06-29 NOTE — ROUTINE PROCESS
1142 assumed care of pt after bedside verbal report was given by off going nurse, pt awake in bed, no acute distress noted, pt denies c/o pain, will monitor    1219 pt resting in bed quietly, no changes noted    1627 pt watching tv, refuses to eat dinner, will monitor     2003 Bedside and Verbal shift change report given to KATHRINE Jacobs (oncoming nurse) by KATHRINE Whitt (offgoing nurse). Report included the following information SBAR, Kardex and MAR.

## 2017-06-29 NOTE — ED NOTES
Dr Michael Odonnell note states care was turned over to me but I was never involved in the care of the patient as his case was d/w dr Tobi Monae who admittted  Candi Lincoln MD

## 2017-06-29 NOTE — ROUTINE PROCESS
Bedside and Verbal shift change report given to Izzy Romo (oncoming nurse) by ray wright rn (offgoing nurse). Report given with SBAR, Kardex, Intake/Output and Recent Results.

## 2017-06-29 NOTE — PROGRESS NOTES
OT order received and chart reviewed. 1422-4930: 1st attempt for OT evaluation. Pt following all commands and cooperative with MMT. However, upon removal of SCDs in preparation for EOB activity, pt stated, \"put those back on (referring to SCDs) and just leave me alone. \" Pt refusing to work with therapy; stating, \"don't come back. \"    Will follow up tomorrow to determine if pt is appropriate for functional OT evaluation.     Oren Olivares, MS OTR/L  Office Ext: [phone]  Pager: [phone]

## 2017-06-29 NOTE — ROUTINE PROCESS
Bedside and Verbal shift change report given to Minnie Lau RN (oncoming nurse) by Pamela Arndt nurse). Report included the following information SBAR, Kardex and MAR.

## 2017-06-29 NOTE — PROGRESS NOTES
Progress Note      Patient: Dannis Hodgkin               Sex: male          DOA: 6/26/2017       YOB: 1940      Age:  67 y.o.        LOS:  LOS: 2 days             CHIEF COMPLAINT:  Sepsis and UTI, metabolic encephalopathy improved    Subjective:     No distress    Objective:      Visit Vitals    /89 (BP 1 Location: Left arm, BP Patient Position: At rest;Sitting)    Pulse 82    Temp 97.5 °F (36.4 °C)    Resp 18    Ht 6' 2\" (1.88 m)    Wt 112 kg (246 lb 14.6 oz)    SpO2 100%    BMI 31.7 kg/m2       Physical Exam:  Gen:  No distress, no complaint  Lungs:  Clear bilaterally, no wheeze or rhonchi  Heart:  Regular rate and rhythm, no murmurs or gallops  Abdomen:  Soft, non-tender, normal bowel sounds        Lab/Data Reviewed: All lab results for the last 24 hours reviewed. Assessment/Plan     Principal Problem:    BREANNE (acute kidney injury) ([address] (6/27/2017)    Active Problems:    UTI (urinary tract infection) (6/12/2017)      Altered mental state (6/27/2017)        Plan:  Continue antibiotics  Mobilize as possible  Anticipate return to SNF soon.

## 2017-06-29 NOTE — PROGRESS NOTES
[facility] Dosing Services     Pharmacy dosing ABX empirically for treatment UTI/sepsis of unknown etiology      Day of Therapy: 3    Labs:   Bun/Serum Creatinine - 21 mg/dl / 1.19 mg/dl  CrCl - 69.2 ml/min  WBC - 6.1  Max temp: - 97.9  Renal fxn improvement     Cultures:   NGTD    Plan:   Vancomycin:   Goal trough 15-20. Increase dose  to 1500 mg iv q12h. Check Vanco-trough on 6/30. Zosyn:  Continue 3.375g IV q8h extended 4 hours infusion / protocol. Levaquin:  Change from 750 mg IV q48h to 750 mg IV q24h / protocol. Pharmacy to follow and will make changes to dose and/or frequency based on clinical status. Thanks for the consult.

## 2017-06-30 NOTE — PROGRESS NOTES
completed follow up visit with patient in room 2115 around 0955 this morning and a Spiritual assessment of patient was done  Patient seemed to be a bit agitated and grumpy this morning, as he complained about the food he was given. Patient seems to be slightly confused as well cause he feels that he has been her in the hospital for more that a week already.   Chaplains will continue to follow and will provide pastoral care on an as needed/requested basis    Chaplain Slikc Castillo   Board Certified [address] [place] [address]   [phone]          [place]

## 2017-06-30 NOTE — PROGRESS NOTES
2nd day of attempting OT evaluation. 0831: pt refusing to get OOB for OT evaluation. Pt observed to have a bowel movement; CNA notified. Will follow up shortly. 1012: 2nd attempt; pt confused and refusing to work with therapy. Does not appear to be appropriate for functional OT evaluation. Will follow up.     Clayton Son MS OTR/L  Office Ext: [phone]  Pager: [phone]

## 2017-06-30 NOTE — PROGRESS NOTES
1028: Orders received and chart reviewed. Attempted to see pt for evaluation but pt refused and stated \"maybe later\"   will attempt 2nd time later.

## 2017-06-30 NOTE — PROGRESS NOTES
Report called to [facility] spoke with Brittany(nurse), will dress and have pt downstairs so that he can be received by [facility] staff, pfreeman rn

## 2017-06-30 NOTE — DISCHARGE SUMMARY
Discharge Summary    Patient: Obdulio Centeno               Sex: male          DOA: 6/26/2017         YOB: 1940      Age:  67 y.o.        LOS:  LOS: 3 days                Admit Date: 6/26/2017    Discharge Date: 6/30/2017    Admission Diagnoses: Altered mental state  UTI (urinary tract infection)  BREANNE (acute kidney injury) ([facility]    Discharge Diagnoses:    Problem List as of 6/30/2017  Date Reviewed: 5/31/2017          Codes Class Noted - Resolved    Altered mental state ICD-10-CM: R41.82  ICD-9-CM: 780.97  6/27/2017 - Present        * (Principal)BREANNE (acute kidney injury) ([facility] ICD-10-CM: N17.9  ICD-9-CM: 584.9  6/27/2017 - Present        Recurrent nephrolithiasis ICD-10-CM: N20.0  ICD-9-CM: 592.0  6/15/2017 - Present        Acute metabolic encephalopathy UTB-39-AR: G93.41  ICD-9-CM: 348.31  6/14/2017 - Present        Lactic acidosis ICD-10-CM: E87.2  ICD-9-CM: 276.2  6/14/2017 - Present        Thrombocytopenia ([facility] ICD-10-CM: D69.6  ICD-9-CM: 287.5  6/14/2017 - Present        Bacteremia ICD-10-CM: R78.81  ICD-9-CM: 790.7  6/13/2017 - Present        Essential hypertension ICD-10-CM: 401  ICD-9-CM: 401  6/13/2017 - Present        BPH (benign prostatic hypertrophy) ICD-10-CM: N40.0  ICD-9-CM: 600.00  6/13/2017 - Present        Hyperlipidemia ICD-10-CM: E78.5  ICD-9-CM: 272.4  6/13/2017 - Present        Alzheimer's dementia without behavioral disturbance ICD-10-CM: G30.9, F02.80  ICD-9-CM: 331.0, 294.10  6/13/2017 - Present        CAD (coronary artery disease) ICD-10-CM: I25.10  ICD-9-CM: 414.00  6/13/2017 - Present        Atrial fibrillation with rapid ventricular response ([facility] ICD-10-CM: I48.91  ICD-9-CM: 427.31  6/13/2017 - Present        Hypokalemia ICD-10-CM: E87.6  ICD-9-CM: 276.8  6/13/2017 - Present        Acute kidney failure ([facility] ICD-10-CM: N17.9  ICD-9-CM: 584.9  6/13/2017 - Present        Hypernatremia ICD-10-CM: E87.0  ICD-9-CM: 276.0  6/13/2017 - Present        Obesity (BMI 30.0-34. 9) ICD-10-CM: E66.9  ICD-9-CM: 278.00  6/13/2017 - Present        UTI (urinary tract infection) ICD-10-CM: N39.0  ICD-9-CM: 599.0  6/12/2017 - Present        Severe sepsis [facility]) ICD-10-CM: A41.9, R65.20  ICD-9-CM: 038.9, 995.92  6/12/2017 - Present              Discharge Condition:  Improved    Hospital Course:  Mr Ester Hernandez is a 67year old white male who presented to the ER with decrease in his mental status. He was found to have Sepsis from UTI and was admitted for ongoing management. Mr Ester Hernandez was treated with IV antibiotics and his mental status has markedly improved. He is tolerating oral diet and at this point he is considered to be ready for transfer back to the [place]. His cultures of blood and urine do not demonstrate new infection. He will continue a course of oral Levaquin after discharge and he will transfer back later today. Consults: None    Significant Diagnostic Studies:     Discharge Medications:     Current Discharge Medication List      CONTINUE these medications which have CHANGED    Details   levoFLOXacin (LEVAQUIN) 500 mg tablet Take 1 Tab by mouth daily for 5 days. Qty: 5 Tab, Refills: 0      acetaminophen (TYLENOL) 325 mg tablet Take 2 Tabs by mouth every four (4) hours as needed for Pain. Qty: 20 Tab, Refills: 0      aspirin 81 mg tablet Take 1 Tab by mouth daily. Qty: 20 Tab, Refills: 0      docusate sodium (COLACE) 100 mg capsule Take 1 Cap by mouth two (2) times a day. Qty: 20 Cap, Refills: 0      Dutasteride-Tamsulosin (DOMINGA) 0.5-0.4 mg CM24 Take 1 Cap by mouth daily (after dinner). Qty: 20 Cap, Refills: 0      finasteride (PROSCAR) 5 mg tablet Take 1 Tab by mouth daily. Qty: 20 Tab, Refills: 0      furosemide (LASIX) 40 mg tablet Take 1 Tab by mouth daily. Qty: 20 Tab, Refills: 0      memantine (NAMENDA) 10 mg tablet Take 1 Tab by mouth daily.   Qty: 20 Tab, Refills: 0      Menthol-Zinc Oxide (RISAMINE) 0.44-20.6 % oint Apply 0.44 Each to affected area two (2) times a day.  Qty: 1 Tube, Refills: 0      metoprolol tartrate 75 mg tab Take 75 mg by mouth two (2) times a day. Qty: 20 Tab, Refills: 0      potassium chloride (KLOR-CON) 20 mEq packet Take 1 Packet by mouth two (2) times a day. Qty: 20 Each, Refills: 0      rosuvastatin (CRESTOR) 5 mg tablet Take 1 Tab by mouth nightly. Qty: 20 Tab, Refills: 0      tamsulosin (FLOMAX) 0.4 mg capsule Take 1 Cap by mouth daily.   Qty: 20 Cap, Refills: 0             Activity: Activity as tolerated    Diet: Regular Diet    Wound Care: None needed    Follow-up: Follow up with staff MD on arrival.

## 2017-06-30 NOTE — ROUTINE PROCESS
Bedside and Verbal shift change report given to [facility] (oncoming nurse) by Ramon Mendez RN (offgoing nurse). Report included the following information SBAR, Kardex, Intake/Output and MAR.

## 2017-06-30 NOTE — ANCILLARY DISCHARGE INSTRUCTIONS
Patient and/or next of kin has been given the [facility] Important Message From Medicare About Your Rights\" letter and all questions were answered. No family at bedside, left copy. Called wife, no answer.

## 2022-03-30 NOTE — DISCHARGE SUMMARY
Anesthesia Pre Eval Note    Anesthesia ROS/Med Hx          Pulmonary Review:    Positive for sleep apnea - CPAP    Cardiovascular Review:    Positive for hyperlipidemia    End/Other Review:    Positive for obesity class II - 35.00 - 39.99  Positive for hypothyroidism    Overall Review of Systems Comments:  History of PE      Relevant Problems   Anesthesia Problems   (+) AYE (obstructive sleep apnea)       Physical Exam     Airway   Mallampati: II  TM Distance: >3 FB  Neck ROM: Limited    General Assessment  General Assessment: Alert and oriented and No acute distress    Dental Exam    Patient has:  Denied broken/chipped/loose teeth    Abdominal Exam    Patient Demonstrates:  Obese      Anesthesia Plan:    Phone call attempted:   No answer, Busy Signal    ASA Status: 3  Anesthesia Type: General    Induction: Intravenous  Preferred Airway Type: ETT  Maintenance: Inhalational  Premedication: Oral  Patient does not have an implantable electronic device requiring post procedure programming.     Post-op Pain Management: Per Surgeon      Checklist  Reviewed: Lab Results, EKG, Medications, Problem list, Allergies, Beta Blocker Status, Patient Summary and NPO Status  Consent/Risks Discussed Statement:  The proposed anesthetic plan, including its risks and benefits, have been discussed with the Patient along with the risks and benefits of alternatives. Questions were encouraged and answered and the patient and/or representative understands and agrees to proceed.    I have discussed elements of the patient's history or examination, as noted above and/or as follows, that place the patient at higher risk of complications; BMI> 30 (obesity), sleep apnea and hematological disease.    I discussed with the patient (and/or patient's legal representative) the risks and benefits of the proposed anesthesia plan, General, which may include services performed by other anesthesia providers.    Alternative anesthesia plans, if available,  Internal Medicine Discharge Summary        Patient: Esha Hutcihns    YOB: 1940    Age:  67 y.o. Admit Date: 6/12/2017    Discharge Date: 6/19/2017    LOS:  LOS: 7 days     Discharge To:  Home    Consults: Cardiology and Urology    Admission Diagnoses: Sepsis ([facility]  UTI (urinary tract infection)  kidney stone    Discharge Diagnoses:    Problem List as of 6/19/2017  Date Reviewed: 5/31/2017          Codes Class Noted - Resolved    Recurrent nephrolithiasis ICD-10-CM: N20.0  ICD-9-CM: 592.0  6/15/2017 - Present        Acute metabolic encephalopathy [facility]-Z[facility]: G93.41  ICD-9-CM: 348.31  6/14/2017 - Present        Lactic acidosis ICD-10-CM: E87.2  ICD-9-CM: 276.2  6/14/2017 - Present        Thrombocytopenia ([facility] ICD-10-CM: D69.6  ICD-9-CM: 287.5  6/14/2017 - Present        Bacteremia ICD-10-CM: R78.81  ICD-9-CM: 790.7  6/13/2017 - Present        Essential hypertension ICD-10-CM: 401  ICD-9-CM: 401  6/13/2017 - Present        BPH (benign prostatic hypertrophy) ICD-10-CM: N40.0  ICD-9-CM: 600.00  6/13/2017 - Present        Hyperlipidemia ICD-10-CM: E78.5  ICD-9-CM: 272.4  6/13/2017 - Present        Alzheimer's dementia without behavioral disturbance ICD-10-CM: G30.9, F02.80  ICD-9-CM: 331.0, 294.10  6/13/2017 - Present        CAD (coronary artery disease) ICD-10-CM: I25.10  ICD-9-CM: 414.00  6/13/2017 - Present        Atrial fibrillation with rapid ventricular response ([facility] ICD-10-CM: I48.91  ICD-9-CM: 427.31  6/13/2017 - Present        Hypokalemia ICD-10-CM: E87.6  ICD-9-CM: 276.8  6/13/2017 - Present        Acute kidney failure ([facility] ICD-10-CM: N17.9  ICD-9-CM: 584.9  6/13/2017 - Present        Hypernatremia ICD-10-CM: E87.0  ICD-9-CM: 276.0  6/13/2017 - Present        Obesity (BMI 30.0-34.9) ICD-10-CM: E66.9  ICD-9-CM: 278.00  6/13/2017 - Present        UTI (urinary tract infection) ICD-10-CM: N39.0  ICD-9-CM: 599.0  6/12/2017 - Present        * (Principal)Severe sepsis ([facility] ICD-10-CM: A41.9, R65.20  ICD-9-CM: 038.9, 995.92  6/12/2017 - Present              Discharge Condition:  Improved    Procedures:   1. Cystourethroscopy  2. Left retrograde pyelogram  3. Left Double-J stent placement  4. Interpretation of fluoroscopy of less than 15 minutes. HPI: The patient is a 79-year-old male  resident of a nursing home. The staff called EMS today when the  patient was not himself socially and he was running a high fever. The  patient has a history of hypertension, Alzheimer's, CAD. According to  the staff, the patient is usually very interactive; however, today he was  not. They grew concerned and decided to send him for evaluation. While in the ER, it is reported that the patient was unresponsive, came  in with a temperature of 105, tachycardic and hypertensive. The patient  was found to have a UTI and elevated lactic acid levels. The patient  was started on IV antibiotics, given IV fluid boluses and over time he  did show signs of improvement. The patient is now more interactive  than he was initially. He, however, will be admitted for sepsis, UTI and  further treatment of his symptoms. Hospital Course (Including Significant Findings): The patient was admitted to the hospital for further management of their presenting condition. He was initially treated with broad spectrum antibiotics, but these were narrowed to zosyn and levaquin alone once initial blood cultures showed GNR. Repeat blood cultures were drawn and were found to be negative, without growth. He eventually grew out e coli in the urine and blood and narrowed down to levaquin. His CT of the abdomen was positive for a distal 5mm stone in the L ureter with mild hydronephrosis and hydroureter. Urology was consulted and although the patient improved with IV antibiotics it was felt he may still be obstructed and therefore was taken to OR for stent placement. He tolerated the procedure well.  He was continued on levaquin were reviewed with the patient (and/or patient's legal representative). Discussion has been held with the patient (and/or patient's legal representative) regarding risks of anesthesia, which include nausea, sore throat, vomiting and need for blood transfusion and emergent situations that may require change in anesthesia plan.    The patient (and/or patient's legal representative) has indicated understanding, his/her questions have been answered, and he/she wishes to proceed with the planned anesthetic.      Blood Products: Not Anticipated     afterwards and discharged with an additional 8 days of therapy. He had uncontrolled atrial fibrillation with RVR and was seen by cardiology. He was on cardizem drip which was eventually titrated down while his metoprolol dose was increased. His rate remained around 100 at that point and he was discharged on 75mg BID. He is a high fall risk and anticoagulation was not recommended at discharge due to his high bleed risk. The rest of the patient's chronic conditions were managed appropriately during their admission. They were medically stable at the time of discharge. Visit Vitals    BP (!) 149/96 (BP 1 Location: Right arm, BP Patient Position: At rest)    Pulse (!) 119    Temp 97.9 °F (36.6 °C)    Resp 26    Ht 6' 2\" (1.88 m)    Wt 118.6 kg (261 lb 6.4 oz)    SpO2 95%    BMI 33.56 kg/m2       Physical Exam at Discharge:  General Appearance: NAD, AA&Ox1  HENT: normocephalic/atraumatic, moist mucus membranes  Lungs: CTA with normal respiratory effort  CV: RRR, no m/r/g  Abdomen: soft, non-tender, normal bowel sounds  Extremities: no cyanosis, no peripheral edema  Neuro: moves all extremities, no focal deficits  Psych: normal mood and affect    Labs Prior to Discharge:  Labs: Results:       Chemistry Recent Labs      06/19/17   0425  06/17/17   0440   GLU  153*  172*   NA  142  144   K  3.4*  3.8   CL  106  109*   CO2  25  25   BUN  17  23*   CREA  1.00  0.93   CA  8.2*  7.8*   AGAP  11  10   BUCR  17  25*      CBC w/Diff Recent Labs      06/17/17   0440   WBC  7.2   RBC  3.88*   HGB  11.2*   HCT  35.5*   PLT  88*   GRANS  69   LYMPH  18*   EOS  1      Cardiac Enzymes No results for input(s): CPK, CKND1, PALOMO in the last 72 hours. No lab exists for component: CKRMB, TROIP   Coagulation No results for input(s): PTP, INR, APTT in the last 72 hours.     No lab exists for component: INREXT    Lipid Panel No results found for: CHOL, CHOLPOCT, CHOLX, CHLST, CHOLV, 872404, HDL, LDL, LDLC, DLDLP, 000787, VLDLC, VLDL, TGLX, TRIGL, TRIGP, TGLPOCT, CHHD, CHHDX   BNP No results for input(s): BNPP in the last 72 hours. Liver Enzymes No results for input(s): TP, ALB, TBIL, AP, SGOT, GPT in the last 72 hours. No lab exists for component: DBIL   Thyroid Studies No results found for: T4, T3U, TSH, TSHEXT         Significant Imaging:  Ct Head Wo Cont    Result Date: 6/12/2017  EXAM: CT head INDICATION: Altered mental status COMPARISON: None. TECHNIQUE: Axial CT imaging of the head was performed without intravenous contrast. DOSE REDUCTION:  One or more dose reduction techniques were used on this CT: automated exposure control, adjustment of the mAs and/or kVp according to patient's size, and iterative reconstruction techniques. The specific techniques utilized on this CT exam have been documented in the patient's electronic medical record. _______________ FINDINGS: The study is markedly limited by streak artifacts. BRAIN AND POSTERIOR FOSSA: There is moderate to significant atrophy. There is no intracranial hemorrhage, mass effect, or midline shift. There is mild to moderate small vessel ischemic disease. EXTRA-AXIAL SPACES AND MENINGES: There are no abnormal extra-axial fluid collections. CALVARIUM: There is a lucency lesion seen at the left skull base measuring 16 x 11 mm. SINUSES: There is mucoperiosteal thickening in the ethmoid sinuses. OTHER: None. _______________     IMPRESSION: Limited exam due to streak artifacts. No acute intracranial abnormalities. Chronic paranasal sinus disease Lucent lesion in the left skull base. This is incompletely evaluated since is only seen on one image. Underlying malignancy is difficult to completely exclude. Ct Abd Pelv Wo Cont    Result Date: 6/12/2017  EXAM: CT of the abdomen and pelvis INDICATION: Altered mental status COMPARISON: Without TECHNIQUE: Axial CT imaging of the abdomen and pelvis was performed without intravenous contrast. Multiplanar reformats were generated.  DOSE REDUCTION: One or more dose reduction techniques were used on this CT: automated exposure control, adjustment of the mAs and/or kVp according to patient's size, and iterative reconstruction techniques. The specific techniques utilized on this CT exam have been documented in the patient's electronic medical record. _______________ FINDINGS: LOWER CHEST: There is bibasilar atelectasis. There is a moderate size hiatal hernia. Along the posterior right aspect of this hiatal hernia there is a soft tissue density in the herniated omental fat measuring 3.9 x 2 cm. This is unclear etiology. LIVER, BILIARY: Liver is normal. No biliary dilation. Tiny gallstone is seen in the neck of the gallbladder measuring 6 mm. PANCREAS: Normal. SPLEEN: Spleen is enlarged measuring 15.7 cm. ADRENALS: Unremarkable KIDNEYS/URETERS: Right kidney is unremarkable. There is perinephric stranding around the left kidney. There is a nonobstructive calculus in the midpole of the left kidney measuring 5 mm. There is mild hydronephrosis and hydroureter down to the left distal ureter where there is an 5 mm calcification image 123. This may represent a phlebolith or a distal ureteral stone. [place] VASCULATURE: Moderate atherosclerosis present. LYMPH NODES: There are no pathologic sized lymph nodes. GASTRO INTESTINAL TRACT: There is a moderate size hiatal hernia. There is no bowel obstruction. Colonic diverticulosis present. There is no evidence of acute diverticulitis. Appendix is normal. PELVIC ORGANS/BLADDER: There is a Archuleta catheter within the bladder. Prostate gland is mildly enlarged. BONES: No acute or aggressive osseous abnormalities identified. There is diffuse osteopenia. There is a probable Tarlov cyst in the sacrum measuring approximately 5 x 2.5 cm. OTHER: None. _______________     IMPRESSION: 1. Mild hydronephrosis and hydroureter on the left.  Along the course of left ureter there is a 5 mm calcific density which may represent a distal ureteral obstructing calculus or a phlebolith. No other bladder calculi seen. Enlarged prostate with Archuleta in place Moderate size hiatal hernia. Soft tissue density is noted along the right posterior aspect of this hiatal hernia of unclear etiology. This may represent fluid or other soft tissue density. Enlarged spleen Gallstone in the neck of the gallbladder. No CT evidence for acute cholecystitis. Colonic diverticulosis without diverticulitis. [facility] Chest Port    Result Date: 6/12/2017  Chest, single view Indication: Sepsis Comparison: None Findings:  Portable upright AP view of the chest was obtained. The lungs are underexpanded. The patient slightly rotated to the right. Cardiac defibrillator pads and monitor leads are noted. Prior median sternotomy and CABG procedure. The cardiomediastinal silhouette is mildly enlarged. There is bronchovascular crowding related to under expansion. No focal consolidation. No pleural effusion nor pneumothorax. No acute osseous abnormality. Impression: Hypoventilatory changes with cardiomegaly. No focal consolidation. Discharge Medications:     Current Discharge Medication List      START taking these medications    Details   levoFLOXacin (LEVAQUIN) 750 mg tablet Take 1 Tab by mouth every twenty-four (24) hours for 8 days. Qty: 8 Tab, Refills: 0         CONTINUE these medications which have CHANGED    Details   metoprolol tartrate (LOPRESSOR) 50 mg tablet Take 1 Tab by mouth every eight (8) hours. Qty: 90 Tab, Refills: 0         CONTINUE these medications which have NOT CHANGED    Details   docusate sodium (COLACE) 100 mg capsule Take 100 mg by mouth two (2) times a day. finasteride (PROSCAR) 5 mg tablet Take 5 mg by mouth daily. furosemide (LASIX) 40 mg tablet Take  by mouth daily. potassium chloride (KLOR-CON) 20 mEq packet Take 20 mEq by mouth two (2) times a day.       Dutasteride-Tamsulosin (DOMINGA) 0.5-0.4 mg CM24 Take 1 Cap by mouth daily (after dinner). Qty: 90 Cap, Refills: 4      memantine (NAMENDA) 10 mg tablet Take  by mouth daily. aspirin 81 mg tablet Take 81 mg by mouth. rosuvastatin (CRESTOR) 5 mg tablet Take  by mouth nightly. Activity: PT/OT per Home Health    Diet: Resume previous diet    Wound Care: None needed    Follow-up:   Please follow up with your PCP within 7 days to discuss your recent hospitalization. Patient to arrange.   Urology         Total time spent including time spent on final examination and discharge discussion, discharge documentation and records reviewed and medication reconciliation: > 30 minutes    Tra Gary DO  Internal Medicine, Hospitalist  Pager: [phone] [facility]

## 2024-04-08 NOTE — PROGRESS NOTES
Internal Medicine Progress Note    Patient's Name: Comfort French  Admit Date: 6/12/2017  Length of Stay: 6      Assessment/Plan     Active Hospital Problems    Diagnosis Date Noted    Recurrent nephrolithiasis 06/15/2017    Acute metabolic encephalopathy 36/31/7188    Lactic acidosis 06/14/2017    Thrombocytopenia ([address] 06/14/2017    Bacteremia 06/13/2017    Essential hypertension 06/13/2017    BPH (benign prostatic hypertrophy) 06/13/2017    Hyperlipidemia 06/13/2017    Alzheimer's dementia without behavioral disturbance 06/13/2017    CAD (coronary artery disease) 06/13/2017    Atrial fibrillation with rapid ventricular response ([address] 06/13/2017    Hypokalemia 06/13/2017    Acute kidney failure ([address] 06/13/2017    Hypernatremia 06/13/2017    Obesity (BMI 30.0-34.9) 06/13/2017    UTI (urinary tract infection) 06/12/2017    Severe sepsis ([address] 06/12/2017     - Cont levaquin (day 6/14)  - Repeat blood cultures NGTD  - D/c fluids today  - Cont meds per cardio  - Patient tolerated hager removal w/ no retention  - s/p L JJ stent  - Appreciate urology recs  - Cont acceptable home medications for chronic conditions   - SCDs    Likely OK for d/c ken    I have personally reviewed all pertinent labs and films that have officially resulted over the last 24 hours. I have personally checked for all pending labs that are awaiting final results.     Subjective     Pt s/e @ bedside  Doing OK, remains confused  Denies CP or SOB  Denies abd pain    Objective     Visit Vitals    BP (!) 137/93 (BP 1 Location: Right arm, BP Patient Position: At rest)    Pulse (!) 103    Temp 97.7 °F (36.5 °C)    Resp 18    Ht 6' 2\" (1.88 m)    Wt 120.2 kg (264 lb 15.9 oz)    SpO2 95%    BMI 34.02 kg/m2       Physical Exam:  General Appearance: NAD, AA&Ox1  Neck: No JVD, supple  Lungs: CTA with normal respiratory effort  CV: IRIR, no m/r/g  Abdomen: soft, non-tender, obese, normal bowel sounds  Extremities: no cyanosis, moves ext    Intake and Output:  Current Shift:     Last three shifts:  06/16 1901 - 06/18 0700  In: 5786.7 [P.O.:2180; I.V.:3596.7]  Out: 3176 [Urine:4725]    Lab/Data Reviewed:  CMP:   No results found for: NA, K, CL, CO2, AGAP, GLU, BUN, CREA, GFRAA, GFRNA, CA, MG, PHOS, ALB, TBIL, TP, ALB, GLOB, AGRAT, SGOT, ALT, GPT  CBC:   No results found for: WBC, HGB, HGBEXT, HCT, HCTEXT, PLT, PLTEXT, HGBEXT, HCTEXT, PLTEXT  All Cardiac Markers in the last 24 hours: No results found for: CPK, CKMMB, CKMB, RCK3, CKMBT, CKNDX, CKND1, PALOMO, TROPT, TROIQ, JUDI, TROPT, TNIPOC, BNP, BNPP    Imaging Reviewed:  No results found.     Medications Reviewed:  Current Facility-Administered Medications   Medication Dose Route Frequency    aspirin delayed-release tablet 162 mg  162 mg Oral DAILY    levoFLOXacin (LEVAQUIN) tablet 750 mg  750 mg Oral Q24H    metoprolol tartrate (LOPRESSOR) tablet 50 mg  50 mg Oral Q8H    dextrose 5% with KCl 40 mEq/L infusion   IntraVENous CONTINUOUS    insulin lispro (HUMALOG) injection   SubCUTAneous AC&HS    glucose chewable tablet 16 g  16 g Oral PRN    glucagon (GLUCAGEN) injection 1 mg  1 mg IntraMUSCular PRN    dextrose (D50) infusion 12.5-25 g  25-50 mL IntraVENous PRN    docusate sodium (COLACE) capsule 100 mg  100 mg Oral BID    furosemide (LASIX) tablet 40 mg  40 mg Oral DAILY    rosuvastatin (CRESTOR) tablet 5 mg  5 mg Oral QHS    dutasteride (AVODART) capsule 0.5 mg  0.5 mg Oral DAILY    And    tamsulosin (FLOMAX) capsule 0.4 mg  0.4 mg Oral DAILY    sodium chloride (NS) flush 5-10 mL  5-10 mL IntraVENous PRN    enoxaparin (LOVENOX) injection 40 mg  40 mg SubCUTAneous Q24H           Cynthia Sweeney DO  Internal Medicine, Hospitalist  Pager: [phone]  [address] [place] [address] no

## (undated) DEVICE — STERILE LATEX POWDER-FREE SURGICAL GLOVESWITH NITRILE COATING: Brand: PROTEXIS

## (undated) DEVICE — CONTAINER DRN 20L DISP FLUIDRN LLS

## (undated) DEVICE — SPONGE GZ W4XL4IN COT 12 PLY TYP VII WVN C FLD DSGN

## (undated) DEVICE — GDWIRE 3CM FLX-TIP 0.038X150CM -- BX/5 SENSOR

## (undated) DEVICE — SOLUTION IV STRL H2O 500 ML AQUALITE POUR BTL

## (undated) DEVICE — NITINOL STONE RETRIEVAL BASKET: Brand: ZERO TIP

## (undated) DEVICE — TRAY PREP DRY W/ PREM GLV 2 APPL 6 SPNG 2 UNDPD 1 OVERWRAP

## (undated) DEVICE — Device

## (undated) DEVICE — KENDALL SCD EXPRESS SLEEVES, KNEE LENGTH, MEDIUM: Brand: KENDALL SCD

## (undated) DEVICE — SOL IRR NACL 0.9% 500ML POUR --

## (undated) DEVICE — SOLUTION IRRIG 3000ML 0.9% SOD CHL FLX CONT 0797208] ICU MEDICAL INC]

## (undated) DEVICE — SYRINGE MED 20ML STD CLR PLAS LUERLOCK TIP N CTRL DISP

## (undated) DEVICE — REM POLYHESIVE ADULT PATIENT RETURN ELECTRODE: Brand: VALLEYLAB

## (undated) DEVICE — OPEN-END FLEXI-TIP URETERAL CATHETER: Brand: FLEXI-TIP

## (undated) DEVICE — CATHETER URETH 16FR BLLN 5CC SIL ALLY W/ SIL HYDRGEL 2 W F

## (undated) DEVICE — BAG DRNGE NONSTERILE W/ SUCT HOSE CYSTO/UROLOGICAL FOR GE